# Patient Record
Sex: MALE | Race: WHITE | NOT HISPANIC OR LATINO | Employment: FULL TIME | URBAN - METROPOLITAN AREA
[De-identification: names, ages, dates, MRNs, and addresses within clinical notes are randomized per-mention and may not be internally consistent; named-entity substitution may affect disease eponyms.]

---

## 2018-03-08 DIAGNOSIS — F41.9 ANXIETY: Primary | ICD-10-CM

## 2018-03-09 RX ORDER — ALPRAZOLAM 0.25 MG/1
TABLET ORAL
Qty: 60 TABLET | Refills: 1 | Status: SHIPPED | OUTPATIENT
Start: 2018-03-09 | End: 2018-06-24 | Stop reason: SDUPTHER

## 2018-05-04 DIAGNOSIS — L30.9 ECZEMA, UNSPECIFIED TYPE: ICD-10-CM

## 2018-05-04 RX ORDER — CLINDAMYCIN AND BENZOYL PEROXIDE 10; 50 MG/G; MG/G
GEL TOPICAL 2 TIMES DAILY
Qty: 50 G | Refills: 3 | Status: SHIPPED | OUTPATIENT
Start: 2018-05-04 | End: 2019-07-10 | Stop reason: SDUPTHER

## 2018-05-04 RX ORDER — DESONIDE 0.5 MG/ML
LOTION TOPICAL 2 TIMES DAILY
Qty: 59 ML | Refills: 2 | Status: SHIPPED | OUTPATIENT
Start: 2018-05-04 | End: 2018-10-04 | Stop reason: SDUPTHER

## 2018-06-24 DIAGNOSIS — F41.9 ANXIETY: ICD-10-CM

## 2018-06-25 RX ORDER — ALPRAZOLAM 0.25 MG/1
TABLET ORAL
Qty: 60 TABLET | Refills: 1 | Status: SHIPPED | OUTPATIENT
Start: 2018-06-25 | End: 2019-01-24 | Stop reason: SDUPTHER

## 2018-07-10 DIAGNOSIS — J01.00 ACUTE NON-RECURRENT MAXILLARY SINUSITIS: Primary | ICD-10-CM

## 2018-07-10 RX ORDER — AZITHROMYCIN 250 MG/1
TABLET, FILM COATED ORAL
Qty: 6 TABLET | Refills: 0 | Status: SHIPPED | OUTPATIENT
Start: 2018-07-10 | End: 2018-07-14

## 2018-10-04 DIAGNOSIS — L30.9 ECZEMA, UNSPECIFIED TYPE: ICD-10-CM

## 2018-10-05 RX ORDER — DESONIDE 0.5 MG/ML
LOTION TOPICAL
Qty: 59 ML | Refills: 1 | Status: SHIPPED | OUTPATIENT
Start: 2018-10-05 | End: 2019-02-27 | Stop reason: SDUPTHER

## 2018-10-07 DIAGNOSIS — K21.9 GASTROESOPHAGEAL REFLUX DISEASE WITHOUT ESOPHAGITIS: Primary | ICD-10-CM

## 2018-10-08 RX ORDER — ESOMEPRAZOLE MAGNESIUM 40 MG/1
CAPSULE, DELAYED RELEASE ORAL
Qty: 90 CAPSULE | Refills: 3 | Status: SHIPPED | OUTPATIENT
Start: 2018-10-08 | End: 2019-09-30 | Stop reason: ALTCHOICE

## 2018-10-11 ENCOUNTER — OFFICE VISIT (OUTPATIENT)
Dept: FAMILY MEDICINE CLINIC | Facility: CLINIC | Age: 43
End: 2018-10-11
Payer: COMMERCIAL

## 2018-10-11 VITALS
SYSTOLIC BLOOD PRESSURE: 110 MMHG | HEIGHT: 68 IN | DIASTOLIC BLOOD PRESSURE: 86 MMHG | BODY MASS INDEX: 29.34 KG/M2 | TEMPERATURE: 98 F | RESPIRATION RATE: 14 BRPM | OXYGEN SATURATION: 97 % | WEIGHT: 193.6 LBS | HEART RATE: 99 BPM

## 2018-10-11 DIAGNOSIS — L03.211 CELLULITIS, FACE: ICD-10-CM

## 2018-10-11 PROCEDURE — 99213 OFFICE O/P EST LOW 20 MIN: CPT | Performed by: FAMILY MEDICINE

## 2018-10-11 PROCEDURE — 1036F TOBACCO NON-USER: CPT | Performed by: FAMILY MEDICINE

## 2018-10-11 NOTE — PROGRESS NOTES
Assessment/Plan:   Cellulitis  Cleocin 150 mg t i d  for 7 days  Wash with soap and water  BenzaClin topical apply b i d  Call if no improvement  Call with any questions or concerns  Diagnoses and all orders for this visit:    Cellulitis, face          Subjective:     Patient ID: Landon Lou is a 37 y o  unknown  This is a 49-year-old gentleman who presents with a pustule on the right face  Review of Systems   Constitutional: Negative  Respiratory: Negative  Cardiovascular: Negative  Skin: Wound: Pustule with surrounding erythema on the right face  Objective:     Physical Exam   Constitutional: He appears well-developed and well-nourished  HENT:   Head: Normocephalic and atraumatic  Skin:   5 mm pustule with surrounding erythema on the right face

## 2019-01-03 DIAGNOSIS — L70.9 ACNE, UNSPECIFIED ACNE TYPE: Primary | ICD-10-CM

## 2019-01-03 RX ORDER — CLINDAMYCIN HYDROCHLORIDE 150 MG/1
CAPSULE ORAL
Qty: 21 CAPSULE | Refills: 0 | Status: SHIPPED | OUTPATIENT
Start: 2019-01-03 | End: 2019-01-10

## 2019-01-24 DIAGNOSIS — F41.9 ANXIETY: ICD-10-CM

## 2019-01-24 RX ORDER — ALPRAZOLAM 0.25 MG/1
TABLET ORAL
Qty: 60 TABLET | Refills: 1 | Status: SHIPPED | OUTPATIENT
Start: 2019-01-24 | End: 2019-05-01 | Stop reason: SDUPTHER

## 2019-02-27 DIAGNOSIS — L30.9 ECZEMA, UNSPECIFIED TYPE: ICD-10-CM

## 2019-02-28 RX ORDER — DESONIDE 0.5 MG/ML
LOTION TOPICAL
Qty: 59 ML | Refills: 1 | Status: SHIPPED | OUTPATIENT
Start: 2019-02-28 | End: 2019-06-19 | Stop reason: SDUPTHER

## 2019-05-01 DIAGNOSIS — F41.9 ANXIETY: ICD-10-CM

## 2019-05-02 RX ORDER — ALPRAZOLAM 0.25 MG/1
TABLET ORAL
Qty: 60 TABLET | Refills: 1 | Status: SHIPPED | OUTPATIENT
Start: 2019-05-02 | End: 2019-08-27 | Stop reason: SDUPTHER

## 2019-06-19 DIAGNOSIS — L30.9 ECZEMA, UNSPECIFIED TYPE: ICD-10-CM

## 2019-06-19 RX ORDER — DESONIDE 0.5 MG/ML
LOTION TOPICAL
Qty: 59 ML | Refills: 1 | Status: SHIPPED | OUTPATIENT
Start: 2019-06-19 | End: 2019-07-11 | Stop reason: SDUPTHER

## 2019-07-10 DIAGNOSIS — L30.9 ECZEMA, UNSPECIFIED TYPE: ICD-10-CM

## 2019-07-11 DIAGNOSIS — L30.9 ECZEMA, UNSPECIFIED TYPE: ICD-10-CM

## 2019-07-11 RX ORDER — DESONIDE 0.5 MG/ML
LOTION TOPICAL 2 TIMES DAILY
Qty: 59 ML | Refills: 1 | Status: SHIPPED | OUTPATIENT
Start: 2019-07-11 | End: 2019-07-24 | Stop reason: SDUPTHER

## 2019-07-11 RX ORDER — CLINDAMYCIN AND BENZOYL PEROXIDE 10; 50 MG/G; MG/G
GEL TOPICAL
Qty: 50 G | Refills: 3 | Status: SHIPPED | OUTPATIENT
Start: 2019-07-11 | End: 2020-08-19

## 2019-07-23 ENCOUNTER — TELEPHONE (OUTPATIENT)
Dept: OTHER | Facility: OTHER | Age: 44
End: 2019-07-23

## 2019-07-24 DIAGNOSIS — L30.9 ECZEMA, UNSPECIFIED TYPE: ICD-10-CM

## 2019-07-24 RX ORDER — DESONIDE 0.5 MG/ML
LOTION TOPICAL 2 TIMES DAILY
Qty: 59 ML | Refills: 1 | Status: SHIPPED | OUTPATIENT
Start: 2019-07-24 | End: 2019-09-30 | Stop reason: ALTCHOICE

## 2019-07-24 NOTE — TELEPHONE ENCOUNTER
Called pt on cell [phone] he states [facility] is giving him a hard time to get a refill of his medication because they are saying he needs preauth from his insurance  [facility] tells him they have called our office many times without response  I told him we have no messages from [facility], but I will call them  Called [facility] [phone] - unable to LM as they don't open til 9AM    Sent a request to Dr Merritt Heimlich for a refill of the Desonide lotion for Ezcema as it looks like it was D/C'ed on the pt's chart

## 2019-07-24 NOTE — TELEPHONE ENCOUNTER
Called pt on cell [phone] he states [facility] is giving him a hard time to get a refill of his medication because they are saying he needs preauth from his insurance  [facility] tells him they have called our office many times without response  I told him we have no messages from [facility], but I will call them      Called [facility] [phone] - unable to LM as they don't open til [place] a request to Dr Sukhwinder Warren for a refill of the Desonide lotion for Ezcema as it looks like it was D/C'ed on the pt's chart

## 2019-07-24 NOTE — TELEPHONE ENCOUNTER
I called [facility] since Lorrie Crane was not able to speak to someone  I spoke to [address] at [facility]   They have been faxing to our old fax number that has been disconnected  I provided the correct number and [address] states she will fax form again  Once we receive, we will start the prior auth process

## 2019-07-24 NOTE — TELEPHONE ENCOUNTER
Received a call from Christina Jacobs  Relayed the following message to Christina Jacobs    Told him we will look out for it

## 2019-08-27 DIAGNOSIS — F41.9 ANXIETY: ICD-10-CM

## 2019-08-27 RX ORDER — ALPRAZOLAM 0.25 MG/1
TABLET ORAL
Qty: 60 TABLET | Refills: 1 | Status: SHIPPED | OUTPATIENT
Start: 2019-08-27 | End: 2019-12-18 | Stop reason: SDUPTHER

## 2019-09-30 ENCOUNTER — OFFICE VISIT (OUTPATIENT)
Dept: FAMILY MEDICINE CLINIC | Facility: CLINIC | Age: 44
End: 2019-09-30
Payer: COMMERCIAL

## 2019-09-30 VITALS
SYSTOLIC BLOOD PRESSURE: 128 MMHG | WEIGHT: 203 LBS | BODY MASS INDEX: 30.07 KG/M2 | OXYGEN SATURATION: 95 % | DIASTOLIC BLOOD PRESSURE: 74 MMHG | RESPIRATION RATE: 16 BRPM | TEMPERATURE: 98.4 F | HEART RATE: 88 BPM | HEIGHT: 69 IN

## 2019-09-30 DIAGNOSIS — Z23 NEED FOR INFLUENZA VACCINATION: ICD-10-CM

## 2019-09-30 DIAGNOSIS — Z13.1 SCREENING FOR DIABETES MELLITUS: ICD-10-CM

## 2019-09-30 DIAGNOSIS — Z00.00 ENCOUNTER FOR ANNUAL GENERAL MEDICAL EXAMINATION WITHOUT ABNORMAL FINDINGS IN ADULT: Primary | ICD-10-CM

## 2019-09-30 DIAGNOSIS — Z23 NEED FOR TDAP VACCINATION: ICD-10-CM

## 2019-09-30 DIAGNOSIS — E34.9 TESTOSTERONE DEFICIENCY: ICD-10-CM

## 2019-09-30 DIAGNOSIS — Z13.220 SCREENING FOR LIPID DISORDERS: ICD-10-CM

## 2019-09-30 DIAGNOSIS — Z13.29 SCREENING FOR THYROID DISORDER: ICD-10-CM

## 2019-09-30 DIAGNOSIS — Z13.0 SCREENING FOR DEFICIENCY ANEMIA: ICD-10-CM

## 2019-09-30 PROBLEM — G47.9 SLEEP DISTURBANCE: Status: ACTIVE | Noted: 2017-04-19

## 2019-09-30 LAB
SL AMB  POCT GLUCOSE, UA: 0
SL AMB LEUKOCYTE ESTERASE,UA: 0
SL AMB POCT BILIRUBIN,UA: 0
SL AMB POCT BLOOD,UA: 0
SL AMB POCT CLARITY,UA: CLEAR
SL AMB POCT COLOR,UA: YELLOW
SL AMB POCT KETONES,UA: 0
SL AMB POCT NITRITE,UA: 0
SL AMB POCT PH,UA: 7
SL AMB POCT SPECIFIC GRAVITY,UA: 1
SL AMB POCT URINE PROTEIN: 0
SL AMB POCT UROBILINOGEN: 0

## 2019-09-30 PROCEDURE — 90686 IIV4 VACC NO PRSV 0.5 ML IM: CPT

## 2019-09-30 PROCEDURE — 36415 COLL VENOUS BLD VENIPUNCTURE: CPT | Performed by: NURSE PRACTITIONER

## 2019-09-30 PROCEDURE — 90471 IMMUNIZATION ADMIN: CPT

## 2019-09-30 PROCEDURE — 90472 IMMUNIZATION ADMIN EACH ADD: CPT

## 2019-09-30 PROCEDURE — 99396 PREV VISIT EST AGE 40-64: CPT | Performed by: NURSE PRACTITIONER

## 2019-09-30 PROCEDURE — 81003 URINALYSIS AUTO W/O SCOPE: CPT | Performed by: NURSE PRACTITIONER

## 2019-09-30 PROCEDURE — 90715 TDAP VACCINE 7 YRS/> IM: CPT

## 2019-09-30 RX ORDER — ANASTROZOLE 1 MG/1
0.5 TABLET ORAL WEEKLY
COMMUNITY

## 2019-09-30 RX ORDER — IMIPRAMINE HYDROCHLORIDE 10 MG/1
1 TABLET, FILM COATED ORAL
COMMUNITY
Start: 2017-04-19 | End: 2019-09-30 | Stop reason: ALTCHOICE

## 2019-09-30 RX ORDER — TESTOSTERONE CYPIONATE 200 MG/ML
50 INJECTION INTRAMUSCULAR ONCE
COMMUNITY
End: 2021-10-04 | Stop reason: SDUPTHER

## 2019-09-30 RX ORDER — NEEDLES, DISPOSABLE 25GX5/8"
NEEDLE, DISPOSABLE MISCELLANEOUS
Refills: 1 | COMMUNITY
Start: 2019-07-09

## 2019-09-30 RX ORDER — FINASTERIDE 1 MG/1
1 TABLET, FILM COATED ORAL DAILY
COMMUNITY

## 2019-09-30 RX ORDER — HYDROCODONE BITARTRATE AND ACETAMINOPHEN 5; 300 MG/1; MG/1
TABLET ORAL
Refills: 0 | COMMUNITY
Start: 2019-07-30 | End: 2019-09-30 | Stop reason: ALTCHOICE

## 2019-09-30 RX ORDER — ESOMEPRAZOLE MAGNESIUM 40 MG/1
1 CAPSULE, DELAYED RELEASE ORAL DAILY
COMMUNITY
Start: 2016-11-09 | End: 2019-10-03

## 2019-09-30 RX ORDER — LEVOTHYROXINE AND LIOTHYRONINE 19; 4.5 UG/1; UG/1
1 TABLET ORAL DAILY
COMMUNITY

## 2019-09-30 RX ORDER — PREDNISONE 10 MG/1
1 TABLET ORAL DAILY
COMMUNITY
Start: 2016-11-09 | End: 2019-09-30 | Stop reason: ALTCHOICE

## 2019-09-30 NOTE — PROGRESS NOTES
[facility] HEALTH MAINTENANCE OFFICE VISIT  [facility]    NAME: Jasson Martínez  AGE: 40 y o  SEX: male  : 1975     DATE: 2019    Assessment and Plan     1  Encounter for annual general medical examination without abnormal findings in adult  Comments:  Age appropriate screenings and recommendations discussed  Orders:  -     CBC and differential  -     Comprehensive metabolic panel  -     Lipid panel  -     TSH, 3rd generation    2  Screening for deficiency anemia  -     CBC and differential    3  Screening for diabetes mellitus  -     Comprehensive metabolic panel    4  Screening for lipid disorders  -     Lipid panel    5  Screening for thyroid disorder  -     TSH, 3rd generation    6  Need for influenza vaccination  -     influenza vaccine, [id number], quadrivalent, 0 5 mL, preservative-free, for adult and pediatric patients 6 mos+ (AFLURIA, FLUARIX, FLULAVAL, FLUZONE)    7  Need for Tdap vaccination  -     TDAP VACCINE GREATER THAN OR EQUAL TO 8YO IM    8  Testosterone deficiency  Assessment & Plan:  Dr Myrtle Kaufman, following up for testosterone injections      · Patient Counseling:   · Nutrition: Stressed importance of a well balanced diet, moderation of sodium/saturated fat, caloric balance and sufficient intake of fiber  · Exercise: Stressed the importance of regular exercise with a goal of 150 minutes per week  · Dental Health: Discussed daily flossing and brushing and regular dental visits   · Sexuality: Discussed sexually transmitted infections, use of condoms and prevention of unintended pregnancy  · Alcohol Use:  Recommended moderation of alcohol intake  · Injury Prevention: Discussed Safety Belts, Safety Helmets, and Smoke Detectors    · Immunizations reviewed: Risks and Benefits discussed  · Discussed benefits of:  Screening labs   BMI Counseling: Body mass index is 30 42 kg/m²  Discussed with patient's BMI with him   The BMI is above normal  Nutrition recommendations include reducing portion sizes, 3-5 servings of fruits/vegetables daily, reducing fast food intake, consuming healthier snacks, decreasing soda and/or juice intake, moderation in carbohydrate intake, increasing intake of lean protein, reducing intake of saturated fat and trans fat and reducing intake of cholesterol  Exercise recommendations include moderate aerobic physical activity for 150 minutes/week and exercising 3-5 times per week  Return in about 1 year (around 9/30/2020) for As Needed, Annual physical     Chief Complaint     Chief Complaint   Patient presents with    Physical Exam       History of Present Illness     HPI    Well Adult Physical   Patient here for a comprehensive physical exam       Diet and Physical Activity  Diet: well balanced diet  Exercise: frequently      Depression Screen  PHQ-9 Depression Screening    PHQ-9:    Frequency of the following problems over the past two weeks:       Little interest or pleasure in doing things:  0 - not at all  Feeling down, depressed, or hopeless:  0 - not at all  PHQ-2 Score:  0          General Health  Hearing: Normal:  bilateral  Vision: vision problems: has not followed up with ophthamology  Dental: regular dental visits    Reproductive Health  No issues       The following portions of the patient's history were reviewed and updated as appropriate: allergies, current medications, past family history, past medical history, past social history, past surgical history and problem list     Review of Systems     Review of Systems   Constitutional: Negative for diaphoresis, fatigue and fever  HENT: Negative for ear pain and hearing loss  Eyes: Negative for pain and visual disturbance  Respiratory: Negative for chest tightness and shortness of breath  Cardiovascular: Negative for chest pain, palpitations and leg swelling  Gastrointestinal: Negative for abdominal pain, constipation and diarrhea  Genitourinary: Negative for difficulty urinating  Musculoskeletal: Negative for arthralgias and myalgias  Skin: Negative for rash  Neurological: Negative for dizziness, numbness and headaches  Psychiatric/Behavioral: Negative for sleep disturbance  Past Medical History     No past medical history on file      Past Surgical History     Past Surgical History:   Procedure Laterality Date    HERNIA REPAIR      Done at birth    [place] SHOULDER SURGERY Right 12/2010       Social History     Social History     Socioeconomic History    Marital status: Single     Spouse name: None    Number of children: None    Years of education: None    Highest education level: None   Occupational History    None   Social Needs    Financial resource strain: None    Food insecurity:     Worry: None     Inability: None    Transportation needs:     Medical: None     Non-medical: None   Tobacco Use    Smoking status: Never Smoker    Smokeless tobacco: Never Used   Substance and Sexual Activity    Alcohol use: Yes     Comment: occasionally    Drug use: No    Sexual activity: None   Lifestyle    Physical activity:     Days per week: None     Minutes per session: None    Stress: None   Relationships    Social connections:     Talks on phone: None     Gets together: None     Attends Temple service: None     Active member of club or organization: None     Attends meetings of clubs or organizations: None     Relationship status: None    Intimate partner violence:     Fear of current or ex partner: None     Emotionally abused: None     Physically abused: None     Forced sexual activity: None   Other Topics Concern    None   Social History Narrative    Caffeine use    Drinks coffee    Never a smoker - As per [employer]        Family History     Family History   Problem Relation Age of Onset    NASIR disease Mother     Thyroid disease Mother     Thyroid disease Father     Esophageal cancer Maternal Aunt     Depression Cousin        Current Medications       Current Outpatient Medications:     ALPRAZolam (XANAX) 0 25 mg tablet, TAKE 1 TABLET BY MOUTH TWICE A DAY AS NEEDED, Disp: 60 tablet, Rfl: 1    anastrozole (ARIMIDEX) 1 mg tablet, Take 1 mg by mouth 2 (two) times a week with meals, Disp: , Rfl:     B-D DISP NEEDLE 25GX1" 25G X 1" MISC, Use as directed, Disp: , Rfl: 1    clindamycin-benzoyl peroxide (BENZACLIN) gel, APPLY BY TOPICAL ROUTE TO THE AFFECTED AREA(S) TWICE A DAY, Disp: 50 g, Rfl: 3    esomeprazole (NexIUM) 40 MG capsule, Take 1 capsule by mouth daily, Disp: , Rfl:     finasteride (PROPECIA) 1 MG tablet, Take 1 tablet by mouth daily, Disp: , Rfl:     testosterone cypionate (DEPO-TESTOSTERONE) 200 mg/mL SOLN, Inject 50 mg into a muscle once, Disp: , Rfl:     thyroid (ARMOUR THYROID) 60 MG tablet, Take 1 tablet by mouth daily, Disp: , Rfl:      Allergies     Allergies   Allergen Reactions    Penicillins     Sulfa Antibiotics        Objective     /74   Pulse 88   Temp 98 4 °F (36 9 °C) (Temporal)   Resp 16   Ht 5' 8 5" (1 74 m)   Wt 92 1 kg (203 lb)   SpO2 95%   BMI 30 42 kg/m²      Physical Exam   Constitutional: He is oriented to person, place, and time  He appears well-developed and well-nourished  HENT:   Head: Normocephalic and atraumatic  Right Ear: Tympanic membrane and external ear normal    Left Ear: Tympanic membrane and external ear normal    Eyes: Pupils are equal, round, and reactive to light  Conjunctivae, EOM and lids are normal  Right conjunctiva is not injected  Left conjunctiva is not injected  Right eye exhibits normal extraocular motion and no nystagmus  Left eye exhibits normal extraocular motion and no nystagmus  Right pupil is round and reactive  Left pupil is round and reactive  Pupils are equal    Neck: Normal range of motion  Neck supple  Carotid bruit is not present  No tracheal deviation present  No thyromegaly present     Cardiovascular: Normal rate, regular rhythm, normal heart sounds and intact distal pulses  Exam reveals no gallop and no friction rub  No murmur heard  Pulmonary/Chest: Effort normal and breath sounds normal  No respiratory distress  He has no wheezes  He has no rales  Abdominal: Soft  Bowel sounds are normal  He exhibits no distension  There is no splenomegaly or hepatomegaly  There is no tenderness  There is no rebound  Musculoskeletal: Normal range of motion  He exhibits no edema, tenderness or deformity  Lymphadenopathy:     He has no cervical adenopathy  Neurological: He is alert and oriented to person, place, and time  He has normal strength and normal reflexes  No cranial nerve deficit  Coordination normal    Skin: Skin is warm and dry  No rash noted  No erythema  Psychiatric: He has a normal mood and affect   His behavior is normal  Judgment and thought content normal          Michelle Weaver, [address] [facility]

## 2019-09-30 NOTE — PATIENT INSTRUCTIONS

## 2019-10-01 LAB
ALBUMIN SERPL-MCNC: 4.7 G/DL (ref 3.5–5.5)
ALBUMIN/GLOB SERPL: 1.6 {RATIO} (ref 1.2–2.2)
ALP SERPL-CCNC: 69 IU/L (ref 39–117)
ALT SERPL-CCNC: 32 IU/L (ref 0–44)
AST SERPL-CCNC: 20 IU/L (ref 0–40)
BASOPHILS # BLD AUTO: 0 X10E3/UL (ref 0–0.2)
BASOPHILS NFR BLD AUTO: 1 %
BILIRUB SERPL-MCNC: 0.2 MG/DL (ref 0–1.2)
BUN SERPL-MCNC: 13 MG/DL (ref 6–24)
BUN/CREAT SERPL: 13 (ref 9–20)
CALCIUM SERPL-MCNC: 9.6 MG/DL (ref 8.7–10.2)
CHLORIDE SERPL-SCNC: 99 MMOL/L (ref 96–106)
CHOLEST SERPL-MCNC: 211 MG/DL (ref 100–199)
CHOLEST/HDLC SERPL: 4.1 RATIO (ref 0–5)
CO2 SERPL-SCNC: 28 MMOL/L (ref 20–29)
CREAT SERPL-MCNC: 1.03 MG/DL (ref 0.76–1.27)
EOSINOPHIL # BLD AUTO: 0.2 X10E3/UL (ref 0–0.4)
EOSINOPHIL NFR BLD AUTO: 4 %
ERYTHROCYTE [DISTWIDTH] IN BLOOD BY AUTOMATED COUNT: 14.7 % (ref 12.3–15.4)
GLOBULIN SER-MCNC: 3 G/DL (ref 1.5–4.5)
GLUCOSE SERPL-MCNC: 100 MG/DL (ref 65–99)
HCT VFR BLD AUTO: 46.9 % (ref 37.5–51)
HDLC SERPL-MCNC: 52 MG/DL
HGB BLD-MCNC: 16.2 G/DL (ref 13–17.7)
IMM GRANULOCYTES # BLD: 0 X10E3/UL (ref 0–0.1)
IMM GRANULOCYTES NFR BLD: 0 %
LDLC SERPL CALC-MCNC: 114 MG/DL (ref 0–99)
LYMPHOCYTES # BLD AUTO: 2.2 X10E3/UL (ref 0.7–3.1)
LYMPHOCYTES NFR BLD AUTO: 41 %
MCH RBC QN AUTO: 31.8 PG (ref 26.6–33)
MCHC RBC AUTO-ENTMCNC: 34.5 G/DL (ref 31.5–35.7)
MCV RBC AUTO: 92 FL (ref 79–97)
MONOCYTES # BLD AUTO: 0.4 X10E3/UL (ref 0.1–0.9)
MONOCYTES NFR BLD AUTO: 7 %
NEUTROPHILS # BLD AUTO: 2.6 X10E3/UL (ref 1.4–7)
NEUTROPHILS NFR BLD AUTO: 47 %
PLATELET # BLD AUTO: 232 X10E3/UL (ref 150–450)
POTASSIUM SERPL-SCNC: 4.1 MMOL/L (ref 3.5–5.2)
PROT SERPL-MCNC: 7.7 G/DL (ref 6–8.5)
RBC # BLD AUTO: 5.1 X10E6/UL (ref 4.14–5.8)
SL AMB EGFR AFRICAN AMERICAN: 102 ML/MIN/1.73
SL AMB EGFR NON AFRICAN AMERICAN: 88 ML/MIN/1.73
SL AMB VLDL CHOLESTEROL CALC: 45 MG/DL (ref 5–40)
SODIUM SERPL-SCNC: 139 MMOL/L (ref 134–144)
TRIGL SERPL-MCNC: 226 MG/DL (ref 0–149)
TSH SERPL DL<=0.005 MIU/L-ACNC: 2.6 UIU/ML (ref 0.45–4.5)
WBC # BLD AUTO: 5.5 X10E3/UL (ref 3.4–10.8)

## 2019-10-03 DIAGNOSIS — K21.9 GASTROESOPHAGEAL REFLUX DISEASE WITHOUT ESOPHAGITIS: ICD-10-CM

## 2019-10-03 RX ORDER — ESOMEPRAZOLE MAGNESIUM 40 MG/1
CAPSULE, DELAYED RELEASE ORAL
Qty: 30 CAPSULE | Refills: 11 | Status: SHIPPED | OUTPATIENT
Start: 2019-10-03 | End: 2020-08-23 | Stop reason: SDUPTHER

## 2019-10-04 ENCOUNTER — TELEPHONE (OUTPATIENT)
Dept: FAMILY MEDICINE CLINIC | Facility: CLINIC | Age: 44
End: 2019-10-04

## 2019-10-04 NOTE — TELEPHONE ENCOUNTER
Was in on Monday to see you  Had a flu shot and tetanus shot in the left arm  Yesterday he started experiencing pain just in the left arm    It hurts, feels like he dislocated his shoulder

## 2019-10-04 NOTE — TELEPHONE ENCOUNTER
Arm pain is common with tetanus and it can be pretty painful  He can take ibuprofen and rest the arm  If he experiences any swelling or persistence of pain >4 days, he can come back to have his arm examined

## 2019-10-08 ENCOUNTER — OFFICE VISIT (OUTPATIENT)
Dept: FAMILY MEDICINE CLINIC | Facility: CLINIC | Age: 44
End: 2019-10-08
Payer: COMMERCIAL

## 2019-10-08 DIAGNOSIS — L30.9 ECZEMA, UNSPECIFIED TYPE: ICD-10-CM

## 2019-10-08 DIAGNOSIS — L73.9 FOLLICULITIS: ICD-10-CM

## 2019-10-08 DIAGNOSIS — E78.1 HYPERTRIGLYCERIDEMIA: Primary | ICD-10-CM

## 2019-10-08 DIAGNOSIS — R73.01 ELEVATED FASTING BLOOD SUGAR: ICD-10-CM

## 2019-10-08 LAB — SL AMB POCT HEMOGLOBIN AIC: 5.2 (ref ?–6.5)

## 2019-10-08 PROCEDURE — 99213 OFFICE O/P EST LOW 20 MIN: CPT | Performed by: NURSE PRACTITIONER

## 2019-10-08 PROCEDURE — 83036 HEMOGLOBIN GLYCOSYLATED A1C: CPT | Performed by: NURSE PRACTITIONER

## 2019-10-08 RX ORDER — CHLORHEXIDINE GLUCONATE 0.12 MG/ML
RINSE ORAL
Refills: 1 | COMMUNITY
Start: 2019-09-10 | End: 2020-01-08 | Stop reason: ALTCHOICE

## 2019-10-08 RX ORDER — OXYCODONE HYDROCHLORIDE AND ACETAMINOPHEN 5; 325 MG/1; MG/1
1 TABLET ORAL EVERY 4 HOURS PRN
Refills: 0 | COMMUNITY
Start: 2019-09-10 | End: 2020-01-08 | Stop reason: ALTCHOICE

## 2019-10-08 RX ORDER — ASCORBIC ACID 500 MG
500 TABLET ORAL DAILY
COMMUNITY

## 2019-10-08 RX ORDER — ZINC GLUCONATE 50 MG
2 TABLET ORAL DAILY
COMMUNITY

## 2019-10-08 RX ORDER — MAG HYDROX/ALUMINUM HYD/SIMETH 400-400-40
SUSPENSION, ORAL (FINAL DOSE FORM) ORAL DAILY
COMMUNITY

## 2019-10-08 RX ORDER — DESONIDE 0.5 MG/ML
LOTION TOPICAL 2 TIMES DAILY
Qty: 59 ML | Refills: 0 | Status: SHIPPED | OUTPATIENT
Start: 2019-10-08 | End: 2020-02-27 | Stop reason: SDUPTHER

## 2019-10-08 RX ORDER — MULTIVITAMIN
1 TABLET ORAL DAILY
COMMUNITY

## 2019-10-08 RX ORDER — CLINDAMYCIN HYDROCHLORIDE 300 MG/1
CAPSULE ORAL
Refills: 0 | COMMUNITY
Start: 2019-09-10 | End: 2020-01-08 | Stop reason: ALTCHOICE

## 2019-10-08 NOTE — PROGRESS NOTES
Assessment/Plan:    1  Hypertriglyceridemia  Assessment & Plan:  Discussed nutrition via mediterranean style eating and exercise 60 minutes daily  Handout provided, reviewed with patient today  Recommend 3 month trial of nutrition and exercise to reduce triglycerides  If no improvement, consider treatment with statin  Pt verbalized understanding and is agreement with plan  Reports motivation for change  Orders:  -     Lipid panel; Future; Expected date: 01/08/2020    2  Elevated fasting blood sugar  Assessment & Plan:  A1c 5 2 wnl  Advised nutrition and exercise  Orders:  -     POCT hemoglobin A1c    3  Folliculitis  -     desonide (DESOWEN) 0 05 % lotion; Apply topically 2 (two) times a day  -     mupirocin (BACTROBAN) 2 % ointment; Apply topically 3 (three) times a day    4  Eczema, unspecified type  -     desonide (DESOWEN) 0 05 % lotion; Apply topically 2 (two) times a day    Patient Instructions   Heart Healthy Diet   WHAT YOU NEED TO KNOW:   A heart healthy diet is an eating plan low in total fat, unhealthy fats, and sodium (salt)  A heart healthy diet helps decrease your risk for heart disease and stroke  Limit the amount of fat you eat to 25% to 35% of your total daily calories  Limit sodium to less than 2,300 mg each day  DISCHARGE INSTRUCTIONS:   Healthy fats:  Healthy fats can help improve cholesterol levels  The risk for heart disease is decreased when cholesterol levels are normal  Choose healthy fats, such as the following:  · Unsaturated fat  is found in foods such as soybean, canola, olive, corn, and safflower oils  It is also found in soft tub margarine that is made with liquid vegetable oil  · Omega-3 fat  is found in certain fish, such as salmon, tuna, and trout, and in walnuts and flaxseed  Unhealthy fats:  Unhealthy fats can cause unhealthy cholesterol levels in your blood and increase your risk of heart disease   Limit unhealthy fats, such as the following:  · Cholesterol  is found in animal foods, such as eggs and lobster, and in dairy products made from whole milk  Limit cholesterol to less than 300 milligrams (mg) each day  You may need to limit cholesterol to 200 mg each day if you have heart disease  · Saturated fat  is found in meats, such as daniel and hamburger  It is also found in chicken or turkey skin, whole milk, and butter  Limit saturated fat to less than 7% of your total daily calories  Limit saturated fat to less than 6% if you have heart disease or are at increased risk for it  · Trans fat  is found in packaged foods, such as potato chips and cookies  It is also in hard margarine, some fried foods, and shortening  Avoid trans fats as much as possible    Heart healthy foods and drinks to include:  Ask your dietitian or healthcare provider how many servings to have from each of the following food groups:  · Grains:      ¨ Whole-wheat breads, cereals, and pastas, and brown rice    ¨ Low-fat, low-sodium crackers and chips    · Vegetables:      ¨ Broccoli, green beans, green peas, and spinach    ¨ Collards, kale, and lima beans    ¨ Carrots, sweet potatoes, tomatoes, and peppers    ¨ Canned vegetables with no salt added    · Fruits:      ¨ Bananas, peaches, pears, and pineapple    ¨ Grapes, raisins, and dates    ¨ Oranges, tangerines, grapefruit, orange juice, and grapefruit juice    ¨ Apricots, mangoes, melons, and papaya    ¨ Raspberries and strawberries    ¨ Canned fruit with no added sugar    · Low-fat dairy products:      ¨ Nonfat (skim) milk, 1% milk, and low-fat almond, cashew, or soy milks fortified with calcium    ¨ Low-fat cheese, regular or frozen yogurt, and cottage cheese    · Meats and proteins , such as lean cuts of beef and pork (loin, leg, round), skinless chicken and turkey, legumes, soy products, egg whites, and nuts  Foods and drinks to limit or avoid:  Ask your dietitian or healthcare provider about these and other foods that are high in unhealthy fat, sodium, and sugar:  · Snack or packaged foods , such as frozen dinners, cookies, macaroni and cheese, and cereals with more than 300 mg of sodium per serving    · Canned or dry mixes  for cakes, soups, sauces, or gravies    · Vegetables with added sodium , such as instant potatoes, vegetables with added sauces, or regular canned vegetables    · Other foods high in sodium , such as ketchup, barbecue sauce, salad dressing, pickles, olives, soy sauce, and miso    · High-fat dairy foods  such as whole or 2% milk, cream cheese, or sour cream, and cheeses     · High-fat protein foods  such as high-fat cuts of beef (T-bone steaks, ribs), chicken or turkey with skin, and organ meats, such as liver    · Cured or smoked meats , such as hot dogs, daniel, and sausage    · Unhealthy fats and oils , such as butter, stick margarine, shortening, and cooking oils such as coconut or palm oil    · Food and drinks high in sugar , such as soft drinks (soda), sports drinks, sweetened tea, candy, cake, cookies, pies, and doughnuts  Other diet guidelines to follow:   · Eat more foods containing omega-3 fats  Eat fish high in omega-3 fats at least 2 times a week  · Limit alcohol  Too much alcohol can damage your heart and raise your blood pressure  Women should limit alcohol to 1 drink a day  Men should limit alcohol to 2 drinks a day  A drink of alcohol is 12 ounces of beer, 5 ounces of wine, or 1½ ounces of liquor  · Choose low-sodium foods  High-sodium foods can lead to high blood pressure  Add little or no salt to food you prepare  Use herbs and spices in place of salt  · Eat more fiber  to help lower cholesterol levels  Eat at least 5 servings of fruits and vegetables each day  Eat 3 ounces of whole-grain foods each day  Legumes (beans) are also a good source of fiber  Lifestyle guidelines:   · Do not smoke  Nicotine and other chemicals in cigarettes and cigars can cause lung and heart damage   Ask your healthcare provider for information if you currently smoke and need help to quit  E-cigarettes or smokeless tobacco still contain nicotine  Talk to your healthcare provider before you use these products  · Exercise regularly  to help you maintain a healthy weight and improve your blood pressure and cholesterol levels  Ask your healthcare provider about the best exercise plan for you  Do not start an exercise program without asking your healthcare provider  Follow up with your healthcare provider as directed:  Write down your questions so you remember to ask them during your visits  © 2017 [facility] Information is for End User's use only and may not be sold, redistributed or otherwise used for commercial purposes  All illustrations and images included in CareNotes® are the copyrighted property of A D A M [employer] Inc  or Nabil Kelley  The above information is an  only  It is not intended as medical advice for individual conditions or treatments  Talk to your doctor, nurse or pharmacist before following any medical regimen to see if it is safe and effective for you  Return in about 3 months (around 1/8/2020) for Recheck Cholesterol  Subjective:      Patient ID: Jimena Marie is a 40 y o  male  Chief Complaint   Patient presents with    Follow-up     BW    Rash       Orquidea Ramos is a 40year old male who presents to the office for discussion of recent fasting labs  Additionally pt reports itchy rash on his chin and neck with red bumps  States he had been using desonide lotion in the past which helped to improve the rash  Denies any drainage or white heads  Reports he shaves his face everyday  Follows up with dermatology who diagnosed with eczema         The following portions of the patient's history were reviewed and updated as appropriate: allergies, current medications, past family history, past medical history, past social history, past surgical history and problem list     Review of Systems   Respiratory: Negative for shortness of breath  Cardiovascular: Negative for chest pain  Skin: Positive for rash  Hematological: Positive for adenopathy           Current Outpatient Medications   Medication Sig Dispense Refill    ALPRAZolam (XANAX) 0 25 mg tablet TAKE 1 TABLET BY MOUTH TWICE A DAY AS NEEDED 60 tablet 1    anastrozole (ARIMIDEX) 1 mg tablet Take 1 mg by mouth 2 (two) times a week with meals      ascorbic acid (VITAMIN C) 500 mg tablet Take 500 mg by mouth daily      B-D DISP NEEDLE 25GX1" 25G X 1" MISC Use as directed  1    Calcium Carbonate-Vitamin D (CALCIUM 600+D PO) Take by mouth daily      Cholecalciferol (VITAMIN D3) 5000 units CAPS Take by mouth daily      clindamycin-benzoyl peroxide (BENZACLIN) gel APPLY BY TOPICAL ROUTE TO THE AFFECTED AREA(S) TWICE A DAY 50 g 3    ECHINACEA PO Take by mouth daily      esomeprazole (NexIUM) 40 MG capsule TAKE 1 CAPSULE DAILY 30 capsule 11    finasteride (PROPECIA) 1 MG tablet Take 1 tablet by mouth daily      hydrocortisone 2 5 % cream Apply 1 application topically 2 (two) times a day as needed To affected area  0    Multiple Vitamin (MULTIVITAMIN) tablet Take 1 tablet by mouth daily      testosterone cypionate (DEPO-TESTOSTERONE) 200 mg/mL SOLN Inject 50 mg into a muscle once      thyroid desiccated (ARMOUR THYROID) 30 mg tablet Take 1 tablet by mouth daily       Zinc 50 MG TABS Take 2 tablets by mouth daily      chlorhexidine (PERIDEX) 0 12 % solution RINSE WITH 1/2 OUNCE FOR 1 MINUTE AND EXPECTORATED TWICE A DAY  1    clindamycin (CLEOCIN) 300 MG capsule TAKE 2 CAPSULES 1 HOUR PRIOR TO SURGERY, THEN TAKE 1 CAPSULE EVERY 12 HOURS  0    desonide (DESOWEN) 0 05 % lotion Apply topically 2 (two) times a day 59 mL 0    mupirocin (BACTROBAN) 2 % ointment Apply topically 3 (three) times a day 22 g 0    oxyCODONE-acetaminophen (PERCOCET) 5-325 mg per tablet Take 1 tablet by mouth every 4 (four) hours as needed 0     No current facility-administered medications for this visit  Objective:     Physical Exam   Constitutional: He is oriented to person, place, and time  He appears well-developed and well-nourished  No distress  Neurological: He is alert and oriented to person, place, and time  Skin: Skin is warm and dry  Rash noted  Rash is papular  He is not diaphoretic  Erythematous papular rash noted on pt's neck, folliculitis   Psychiatric: He has a normal mood and affect   His behavior is normal  Judgment and thought content normal          BILL Crockett

## 2019-10-08 NOTE — ASSESSMENT & PLAN NOTE
Discussed nutrition via mediterranean style eating and exercise 60 minutes daily  Handout provided, reviewed with patient today  Recommend 3 month trial of nutrition and exercise to reduce triglycerides  If no improvement, consider treatment with statin  Pt verbalized understanding and is agreement with plan  Reports motivation for change

## 2019-10-08 NOTE — PATIENT INSTRUCTIONS
Heart Healthy Diet   WHAT YOU NEED TO KNOW:   A heart healthy diet is an eating plan low in total fat, unhealthy fats, and sodium (salt)  A heart healthy diet helps decrease your risk for heart disease and stroke  Limit the amount of fat you eat to 25% to 35% of your total daily calories  Limit sodium to less than 2,300 mg each day  DISCHARGE INSTRUCTIONS:   Healthy fats:  Healthy fats can help improve cholesterol levels  The risk for heart disease is decreased when cholesterol levels are normal  Choose healthy fats, such as the following:  · Unsaturated fat  is found in foods such as soybean, canola, olive, corn, and safflower oils  It is also found in soft tub margarine that is made with liquid vegetable oil  · Omega-3 fat  is found in certain fish, such as salmon, tuna, and trout, and in walnuts and flaxseed  Unhealthy fats:  Unhealthy fats can cause unhealthy cholesterol levels in your blood and increase your risk of heart disease  Limit unhealthy fats, such as the following:  · Cholesterol  is found in animal foods, such as eggs and lobster, and in dairy products made from whole milk  Limit cholesterol to less than 300 milligrams (mg) each day  You may need to limit cholesterol to 200 mg each day if you have heart disease  · Saturated fat  is found in meats, such as daniel and hamburger  It is also found in chicken or turkey skin, whole milk, and butter  Limit saturated fat to less than 7% of your total daily calories  Limit saturated fat to less than 6% if you have heart disease or are at increased risk for it  · Trans fat  is found in packaged foods, such as potato chips and cookies  It is also in hard margarine, some fried foods, and shortening  Avoid trans fats as much as possible    Heart healthy foods and drinks to include:  Ask your dietitian or healthcare provider how many servings to have from each of the following food groups:  · Grains:      ¨ Whole-wheat breads, cereals, and pastas, and brown rice    ¨ Low-fat, low-sodium crackers and chips    · Vegetables:      ¨ Broccoli, green beans, green peas, and spinach    ¨ Collards, kale, and lima beans    ¨ Carrots, sweet potatoes, tomatoes, and peppers    ¨ Canned vegetables with no salt added    · Fruits:      ¨ Bananas, peaches, pears, and pineapple    ¨ Grapes, raisins, and dates    ¨ Oranges, tangerines, grapefruit, orange juice, and grapefruit juice    ¨ Apricots, mangoes, melons, and papaya    ¨ Raspberries and strawberries    ¨ Canned fruit with no added sugar    · Low-fat dairy products:      ¨ Nonfat (skim) milk, 1% milk, and low-fat almond, cashew, or soy milks fortified with calcium    ¨ Low-fat cheese, regular or frozen yogurt, and cottage cheese    · Meats and proteins , such as lean cuts of beef and pork (loin, leg, round), skinless chicken and turkey, legumes, soy products, egg whites, and nuts  Foods and drinks to limit or avoid:  Ask your dietitian or healthcare provider about these and other foods that are high in unhealthy fat, sodium, and sugar:  · Snack or packaged foods , such as frozen dinners, cookies, macaroni and cheese, and cereals with more than 300 mg of sodium per serving    · Canned or dry mixes  for cakes, soups, sauces, or gravies    · Vegetables with added sodium , such as instant potatoes, vegetables with added sauces, or regular canned vegetables    · Other foods high in sodium , such as ketchup, barbecue sauce, salad dressing, pickles, olives, soy sauce, and miso    · High-fat dairy foods  such as whole or 2% milk, cream cheese, or sour cream, and cheeses     · High-fat protein foods  such as high-fat cuts of beef (T-bone steaks, ribs), chicken or turkey with skin, and organ meats, such as liver    · Cured or smoked meats , such as hot dogs, daniel, and sausage    · Unhealthy fats and oils , such as butter, stick margarine, shortening, and cooking oils such as coconut or palm oil    · Food and drinks high in sugar , such as soft drinks (soda), sports drinks, sweetened tea, candy, cake, cookies, pies, and doughnuts  Other diet guidelines to follow:   · Eat more foods containing omega-3 fats  Eat fish high in omega-3 fats at least 2 times a week  · Limit alcohol  Too much alcohol can damage your heart and raise your blood pressure  Women should limit alcohol to 1 drink a day  Men should limit alcohol to 2 drinks a day  A drink of alcohol is 12 ounces of beer, 5 ounces of wine, or 1½ ounces of liquor  · Choose low-sodium foods  High-sodium foods can lead to high blood pressure  Add little or no salt to food you prepare  Use herbs and spices in place of salt  · Eat more fiber  to help lower cholesterol levels  Eat at least 5 servings of fruits and vegetables each day  Eat 3 ounces of whole-grain foods each day  Legumes (beans) are also a good source of fiber  Lifestyle guidelines:   · Do not smoke  Nicotine and other chemicals in cigarettes and cigars can cause lung and heart damage  Ask your healthcare provider for information if you currently smoke and need help to quit  E-cigarettes or smokeless tobacco still contain nicotine  Talk to your healthcare provider before you use these products  · Exercise regularly  to help you maintain a healthy weight and improve your blood pressure and cholesterol levels  Ask your healthcare provider about the best exercise plan for you  Do not start an exercise program without asking your healthcare provider  Follow up with your healthcare provider as directed:  Write down your questions so you remember to ask them during your visits  © 2017 [address] [employer] Information is for End User's use only and may not be sold, redistributed or otherwise used for commercial purposes  All illustrations and images included in CareNotes® are the copyrighted property of A D [employer]  or Nabil Kelley  The above information is an  only   It is not intended as medical advice for individual conditions or treatments  Talk to your doctor, nurse or pharmacist before following any medical regimen to see if it is safe and effective for you

## 2019-12-18 DIAGNOSIS — F41.9 ANXIETY: ICD-10-CM

## 2019-12-19 RX ORDER — ALPRAZOLAM 0.25 MG/1
TABLET ORAL
Qty: 60 TABLET | Refills: 1 | Status: SHIPPED | OUTPATIENT
Start: 2019-12-19 | End: 2020-04-03

## 2020-01-08 ENCOUNTER — OFFICE VISIT (OUTPATIENT)
Dept: FAMILY MEDICINE CLINIC | Facility: CLINIC | Age: 45
End: 2020-01-08
Payer: COMMERCIAL

## 2020-01-08 VITALS
SYSTOLIC BLOOD PRESSURE: 124 MMHG | OXYGEN SATURATION: 96 % | WEIGHT: 201 LBS | BODY MASS INDEX: 29.77 KG/M2 | DIASTOLIC BLOOD PRESSURE: 70 MMHG | HEIGHT: 69 IN | TEMPERATURE: 97.7 F | RESPIRATION RATE: 16 BRPM | HEART RATE: 86 BPM

## 2020-01-08 DIAGNOSIS — E78.1 HYPERTRIGLYCERIDEMIA: Primary | ICD-10-CM

## 2020-01-08 DIAGNOSIS — M77.8 RIGHT WRIST TENDONITIS: ICD-10-CM

## 2020-01-08 PROCEDURE — 36415 COLL VENOUS BLD VENIPUNCTURE: CPT | Performed by: NURSE PRACTITIONER

## 2020-01-08 PROCEDURE — 99213 OFFICE O/P EST LOW 20 MIN: CPT | Performed by: NURSE PRACTITIONER

## 2020-01-08 RX ORDER — PHENTERMINE HYDROCHLORIDE 37.5 MG/1
37.5 TABLET ORAL DAILY PRN
Refills: 0 | COMMUNITY
Start: 2019-12-01 | End: 2020-01-16 | Stop reason: ALTCHOICE

## 2020-01-08 NOTE — ASSESSMENT & PLAN NOTE
Discussed nutrition and exercise  Recommend he continue physical activity daily  Will evaluate fasting lipid panel today  Call with results, determine need for intervention  Recheck 6 months

## 2020-01-08 NOTE — ASSESSMENT & PLAN NOTE
Symptoms improving per pt  Advise rest, ice and ibuprofen as directed and with food  RTO if symptoms persist or worsen

## 2020-01-08 NOTE — PROGRESS NOTES
Assessment/Plan:    1  Hypertriglyceridemia  Assessment & Plan:  Discussed nutrition and exercise  Recommend he continue physical activity daily  Will evaluate fasting lipid panel today  Call with results, determine need for intervention  Recheck 6 months  Orders:  -     Lipid panel    2  Right wrist tendonitis  Assessment & Plan:  Symptoms improving per pt  Advise rest, ice and ibuprofen as directed and with food  RTO if symptoms persist or worsen  Return in about 6 months (around 7/8/2020) for Recheck Lipid Panel   Subjective:      Patient ID: Alexis Ortiz is a 40 y o  male  Chief Complaint   Patient presents with    Follow-up     cholesterol       Garret Osuna is a 40year old male with hypertriglyceridemia who presents to the office for a recheck of triglycerides  He is not on any medication at this time for treatment  He reports he has been having some difficulty with nutrition r/t the recent holidays  States he continues to work out daily  Additionally, reporting pain in right wrist with movement x's 2-3 months  Denies numbness or tingling of the fingers  The following portions of the patient's history were reviewed and updated as appropriate: allergies, current medications, past family history, past medical history, past social history, past surgical history and problem list     Review of Systems   Respiratory: Negative for chest tightness and shortness of breath  Cardiovascular: Negative for chest pain and leg swelling  Musculoskeletal: Positive for arthralgias (right wrist)  Neurological: Negative for dizziness           Current Outpatient Medications   Medication Sig Dispense Refill    ALPRAZolam (XANAX) 0 25 mg tablet TAKE 1 TABLET BY MOUTH TWICE A DAY AS NEEDED 60 tablet 1    anastrozole (ARIMIDEX) 1 mg tablet Take 1 mg by mouth 2 (two) times a week with meals      B-D DISP NEEDLE 25GX1" 25G X 1" MISC Use as directed  1    Calcium Carbonate-Vitamin D (CALCIUM 600+D PO) Take by mouth daily      Cholecalciferol (VITAMIN D3) 5000 units CAPS Take by mouth daily      clindamycin-benzoyl peroxide (BENZACLIN) gel APPLY BY TOPICAL ROUTE TO THE AFFECTED AREA(S) TWICE A DAY 50 g 3    desonide (DESOWEN) 0 05 % lotion Apply topically 2 (two) times a day 59 mL 0    ECHINACEA PO Take by mouth daily      esomeprazole (NexIUM) 40 MG capsule TAKE 1 CAPSULE DAILY 30 capsule 11    finasteride (PROPECIA) 1 MG tablet Take 1 tablet by mouth daily      Multiple Vitamin (MULTIVITAMIN) tablet Take 1 tablet by mouth daily      phentermine (ADIPEX-P) 37 5 MG tablet Take 37 5 mg by mouth daily as needed  0    testosterone cypionate (DEPO-TESTOSTERONE) 200 mg/mL SOLN Inject 50 mg into a muscle once      thyroid desiccated (ARMOUR THYROID) 30 mg tablet Take 1 tablet by mouth daily       Zinc 50 MG TABS Take 2 tablets by mouth daily      ascorbic acid (VITAMIN C) 500 mg tablet Take 500 mg by mouth daily      hydrocortisone 2 5 % cream Apply 1 application topically 2 (two) times a day as needed To affected area  0    mupirocin (BACTROBAN) 2 % ointment Apply topically 3 (three) times a day (Patient not taking: Reported on 1/8/2020) 22 g 0     No current facility-administered medications for this visit  Objective:    /70   Pulse 86   Temp 97 7 °F (36 5 °C) (Temporal)   Resp 16   Ht 5' 8 5" (1 74 m)   Wt 91 2 kg (201 lb)   SpO2 96%   BMI 30 12 kg/m²        Physical Exam   Constitutional: He is oriented to person, place, and time  He appears well-developed and well-nourished  No distress  Musculoskeletal:        Right wrist: He exhibits normal range of motion, no tenderness, no bony tenderness, no swelling, no effusion, no crepitus and no deformity  Phalen test negative   Neurological: He is alert and oriented to person, place, and time  Skin: Skin is warm and dry  He is not diaphoretic  Psychiatric: He has a normal mood and affect   His behavior is normal  Judgment and thought content normal          BILL Rose

## 2020-01-09 LAB
CHOLEST SERPL-MCNC: 212 MG/DL (ref 100–199)
CHOLEST/HDLC SERPL: 3.7 RATIO (ref 0–5)
HDLC SERPL-MCNC: 57 MG/DL
LDLC SERPL CALC-MCNC: 137 MG/DL (ref 0–99)
SL AMB VLDL CHOLESTEROL CALC: 18 MG/DL (ref 5–40)
TRIGL SERPL-MCNC: 90 MG/DL (ref 0–149)

## 2020-01-16 ENCOUNTER — OFFICE VISIT (OUTPATIENT)
Dept: FAMILY MEDICINE CLINIC | Facility: CLINIC | Age: 45
End: 2020-01-16
Payer: COMMERCIAL

## 2020-01-16 VITALS
TEMPERATURE: 98.1 F | WEIGHT: 207.4 LBS | RESPIRATION RATE: 16 BRPM | OXYGEN SATURATION: 98 % | HEIGHT: 68 IN | SYSTOLIC BLOOD PRESSURE: 118 MMHG | HEART RATE: 109 BPM | BODY MASS INDEX: 31.43 KG/M2 | DIASTOLIC BLOOD PRESSURE: 80 MMHG

## 2020-01-16 DIAGNOSIS — J11.1 INFLUENZA: Primary | ICD-10-CM

## 2020-01-16 PROCEDURE — 1036F TOBACCO NON-USER: CPT | Performed by: NURSE PRACTITIONER

## 2020-01-16 PROCEDURE — 3725F SCREEN DEPRESSION PERFORMED: CPT | Performed by: NURSE PRACTITIONER

## 2020-01-16 PROCEDURE — 99213 OFFICE O/P EST LOW 20 MIN: CPT | Performed by: NURSE PRACTITIONER

## 2020-01-16 PROCEDURE — 3008F BODY MASS INDEX DOCD: CPT | Performed by: NURSE PRACTITIONER

## 2020-01-16 RX ORDER — OSELTAMIVIR PHOSPHATE 75 MG/1
75 CAPSULE ORAL EVERY 12 HOURS SCHEDULED
Qty: 10 CAPSULE | Refills: 0 | Status: SHIPPED | OUTPATIENT
Start: 2020-01-16 | End: 2020-01-21

## 2020-01-16 NOTE — PROGRESS NOTES
Assessment/Plan:    1  Influenza  Comments:  Symptoms consistent with influenza  Recommend fluids - water, gatorade and/or pedialyte  Orders:  -     oseltamivir (TAMIFLU) 75 mg capsule; Take 1 capsule (75 mg total) by mouth every 12 (twelve) hours for 5 days      Patient Instructions   Increase fluid intake as tolerated  Rest and humidification   Continue medications as directed   - tamiflu for full course  - pro-biotic to protect stomach while on medication   - Flonase OTC 1-2 sprays each nostril daily PRN post nasal drip   - Mucinex OTC to loosen secretions   Return to office in one week if symptoms persist or worsen        Return in about 1 week (around 1/23/2020), or if symptoms worsen or fail to improve  Subjective:      Patient ID: Hung Gale is a 40 y o  male  Chief Complaint   Patient presents with    Fever     intermittent since yesterday    Cough    Headache    chest congestion       Sisi David is a 40year old male who presents to the office for evaluation of sudden onset fatigue, headache, sore throat and cough x's 1 day  Reports ongoing fever with Tmax 101 F with intermittent chills  Denies shortness of breath, chest pain or wheezing, n/v/d  Fever   Associated symptoms include chills, coughing, fatigue, a fever, headaches and a sore throat  Pertinent negatives include no chest pain, congestion, diaphoresis, myalgias, nausea, rash or vomiting  Cough   Associated symptoms include chills, a fever, headaches and a sore throat  Pertinent negatives include no chest pain, ear pain, myalgias, postnasal drip, rash, rhinorrhea, shortness of breath or wheezing  Headache    Associated symptoms include coughing, a fever and a sore throat  Pertinent negatives include no dizziness, ear pain, eye pain, nausea, rhinorrhea, sinus pressure or vomiting         The following portions of the patient's history were reviewed and updated as appropriate: allergies, current medications, past family history, past medical history, past social history, past surgical history and problem list     Review of Systems   Constitutional: Positive for chills, fatigue and fever  Negative for diaphoresis  HENT: Positive for sore throat  Negative for congestion, ear discharge, ear pain, postnasal drip, rhinorrhea, sinus pressure and sinus pain  Eyes: Negative for pain and discharge  Respiratory: Positive for cough  Negative for chest tightness, shortness of breath and wheezing  Cardiovascular: Negative for chest pain  Gastrointestinal: Negative for diarrhea, nausea and vomiting  Genitourinary: Negative for dysuria  Musculoskeletal: Negative for myalgias  Skin: Negative for rash  Neurological: Positive for headaches  Negative for dizziness  Hematological: Negative for adenopathy           Current Outpatient Medications   Medication Sig Dispense Refill    ALPRAZolam (XANAX) 0 25 mg tablet TAKE 1 TABLET BY MOUTH TWICE A DAY AS NEEDED 60 tablet 1    anastrozole (ARIMIDEX) 1 mg tablet Take 1 mg by mouth 2 (two) times a week with meals      ascorbic acid (VITAMIN C) 500 mg tablet Take 500 mg by mouth daily      B-D DISP NEEDLE 25GX1" 25G X 1" MISC Use as directed  1    Calcium Carbonate-Vitamin D (CALCIUM 600+D PO) Take by mouth daily      Cholecalciferol (VITAMIN D3) 5000 units CAPS Take by mouth daily      clindamycin-benzoyl peroxide (BENZACLIN) gel APPLY BY TOPICAL ROUTE TO THE AFFECTED AREA(S) TWICE A DAY 50 g 3    desonide (DESOWEN) 0 05 % lotion Apply topically 2 (two) times a day 59 mL 0    ECHINACEA PO Take by mouth daily      esomeprazole (NexIUM) 40 MG capsule TAKE 1 CAPSULE DAILY 30 capsule 11    finasteride (PROPECIA) 1 MG tablet Take 1 tablet by mouth daily      Multiple Vitamin (MULTIVITAMIN) tablet Take 1 tablet by mouth daily      testosterone cypionate (DEPO-TESTOSTERONE) 200 mg/mL SOLN Inject 50 mg into a muscle once      thyroid desiccated (ARMOUR THYROID) 30 mg tablet Take 1 tablet by mouth daily       Zinc 50 MG TABS Take 2 tablets by mouth daily      oseltamivir (TAMIFLU) 75 mg capsule Take 1 capsule (75 mg total) by mouth every 12 (twelve) hours for 5 days 10 capsule 0     No current facility-administered medications for this visit  Objective:    /80   Pulse (!) 109   Temp 98 1 °F (36 7 °C) (Temporal)   Resp 16   Ht 5' 8" (1 727 m)   Wt 94 1 kg (207 lb 6 4 oz)   SpO2 98%   BMI 31 54 kg/m²        Physical Exam   Constitutional: He is oriented to person, place, and time  He appears well-developed and well-nourished  No distress  HENT:   Head: Normocephalic and atraumatic  Right Ear: Tympanic membrane, external ear and ear canal normal  No drainage, swelling or tenderness  No middle ear effusion  Left Ear: External ear and ear canal normal  No drainage, swelling or tenderness  Tympanic membrane is erythematous  No middle ear effusion  Nose: Mucosal edema and rhinorrhea present  No sinus tenderness  Right sinus exhibits no maxillary sinus tenderness and no frontal sinus tenderness  Left sinus exhibits no maxillary sinus tenderness and no frontal sinus tenderness  Mouth/Throat: Uvula is midline and mucous membranes are normal  Posterior oropharyngeal edema and posterior oropharyngeal erythema present  No oropharyngeal exudate  Eyes: Conjunctivae are normal  Right eye exhibits no discharge  Left eye exhibits no discharge  Neck: Normal range of motion  Neck supple  No thyromegaly present  Cardiovascular: Normal rate, regular rhythm and normal heart sounds  Pulmonary/Chest: Effort normal and breath sounds normal  No respiratory distress  He has no decreased breath sounds  He has no wheezes  He has no rhonchi  He has no rales  Lymphadenopathy:     He has no cervical adenopathy  Neurological: He is alert and oriented to person, place, and time  Skin: Skin is warm and dry  No rash noted  He is not diaphoretic  Psychiatric: He has a normal mood and affect  His behavior is normal  Thought content normal               BILL Rowe

## 2020-01-16 NOTE — PATIENT INSTRUCTIONS
Increase fluid intake as tolerated  Rest and humidification   Continue medications as directed   - tamiflu for full course  - pro-biotic to protect stomach while on medication   - Flonase OTC 1-2 sprays each nostril daily PRN post nasal drip   - Mucinex OTC to loosen secretions   Return to office in one week if symptoms persist or worsen

## 2020-02-27 DIAGNOSIS — L30.9 ECZEMA, UNSPECIFIED TYPE: ICD-10-CM

## 2020-02-27 DIAGNOSIS — L73.9 FOLLICULITIS: ICD-10-CM

## 2020-02-27 RX ORDER — DESONIDE 0.5 MG/ML
LOTION TOPICAL 2 TIMES DAILY
Qty: 59 ML | Refills: 0 | Status: SHIPPED | OUTPATIENT
Start: 2020-02-27 | End: 2020-02-28 | Stop reason: CLARIF

## 2020-02-28 ENCOUNTER — TELEPHONE (OUTPATIENT)
Dept: FAMILY MEDICINE CLINIC | Facility: CLINIC | Age: 45
End: 2020-02-28

## 2020-02-28 DIAGNOSIS — L30.9 ECZEMA, UNSPECIFIED TYPE: ICD-10-CM

## 2020-02-28 DIAGNOSIS — L73.9 FOLLICULITIS: Primary | ICD-10-CM

## 2020-02-28 RX ORDER — DESONIDE 0.5 MG/G
CREAM TOPICAL 2 TIMES DAILY
Qty: 30 G | Refills: 0 | Status: SHIPPED | OUTPATIENT
Start: 2020-02-28 | End: 2020-05-04

## 2020-02-28 NOTE — TELEPHONE ENCOUNTER
Left message to return my call -  Insurance Denied both my attempts for the prior auths on Desonide 0 05% lotion  The reason is as followed

## 2020-02-28 NOTE — TELEPHONE ENCOUNTER
When Veronica calls back, please notify him Carolyn Hill called in Desonide 0 05% cream instead to [address] [place] [address] for him to try

## 2020-03-03 NOTE — TELEPHONE ENCOUNTER
Spoke to Veronica  He states he got all the messages an picked up the Desonide cream   I informed him to keep us posted on how this works and Veronica agreed  No further action required at this time

## 2020-04-03 DIAGNOSIS — F41.9 ANXIETY: ICD-10-CM

## 2020-04-03 RX ORDER — ALPRAZOLAM 0.25 MG/1
TABLET ORAL
Qty: 60 TABLET | Refills: 1 | Status: SHIPPED | OUTPATIENT
Start: 2020-04-03 | End: 2020-07-20

## 2020-05-03 DIAGNOSIS — L30.9 ECZEMA, UNSPECIFIED TYPE: ICD-10-CM

## 2020-05-03 DIAGNOSIS — L73.9 FOLLICULITIS: ICD-10-CM

## 2020-05-04 RX ORDER — DESONIDE 0.5 MG/G
CREAM TOPICAL
Qty: 30 G | Refills: 0 | Status: SHIPPED | OUTPATIENT
Start: 2020-05-04 | End: 2020-07-20

## 2020-06-15 DIAGNOSIS — E78.1 HYPERTRIGLYCERIDEMIA: Primary | ICD-10-CM

## 2020-06-30 ENCOUNTER — TELEPHONE (OUTPATIENT)
Dept: FAMILY MEDICINE CLINIC | Facility: CLINIC | Age: 45
End: 2020-06-30

## 2020-06-30 NOTE — TELEPHONE ENCOUNTER
Uyen Estes would like to get the Antibody test done  He called his insurance company, they will cover the test as long as it is medically necessary  He will need a script  I asked him if they gave him the codes to use and they did not  He is going to call them back and ask for the codes    He will call us back

## 2020-07-17 DIAGNOSIS — F41.9 ANXIETY: ICD-10-CM

## 2020-07-17 DIAGNOSIS — L73.9 FOLLICULITIS: ICD-10-CM

## 2020-07-17 DIAGNOSIS — L30.9 ECZEMA, UNSPECIFIED TYPE: ICD-10-CM

## 2020-07-18 DIAGNOSIS — F41.9 ANXIETY: ICD-10-CM

## 2020-07-18 RX ORDER — ALPRAZOLAM 0.25 MG/1
TABLET ORAL
Qty: 60 TABLET | Refills: 1 | OUTPATIENT
Start: 2020-07-18 | End: 2020-08-17

## 2020-07-20 RX ORDER — DESONIDE 0.5 MG/G
CREAM TOPICAL
Qty: 30 G | Refills: 0 | Status: SHIPPED | OUTPATIENT
Start: 2020-07-20

## 2020-07-20 RX ORDER — ALPRAZOLAM 0.25 MG/1
TABLET ORAL
Qty: 60 TABLET | Refills: 0 | Status: SHIPPED | OUTPATIENT
Start: 2020-07-20 | End: 2020-08-26

## 2020-08-18 DIAGNOSIS — L30.9 ECZEMA, UNSPECIFIED TYPE: ICD-10-CM

## 2020-08-18 LAB
CHOLEST SERPL-MCNC: 174 MG/DL (ref 100–199)
CHOLEST/HDLC SERPL: 3.5 RATIO (ref 0–5)
HDLC SERPL-MCNC: 50 MG/DL
LDLC SERPL CALC-MCNC: 103 MG/DL (ref 0–99)
SL AMB VLDL CHOLESTEROL CALC: 21 MG/DL (ref 5–40)
TRIGL SERPL-MCNC: 105 MG/DL (ref 0–149)

## 2020-08-19 ENCOUNTER — OFFICE VISIT (OUTPATIENT)
Dept: FAMILY MEDICINE CLINIC | Facility: CLINIC | Age: 45
End: 2020-08-19
Payer: COMMERCIAL

## 2020-08-19 VITALS
DIASTOLIC BLOOD PRESSURE: 80 MMHG | HEART RATE: 95 BPM | WEIGHT: 195 LBS | TEMPERATURE: 97.8 F | OXYGEN SATURATION: 97 % | RESPIRATION RATE: 16 BRPM | HEIGHT: 68 IN | SYSTOLIC BLOOD PRESSURE: 130 MMHG | BODY MASS INDEX: 29.55 KG/M2

## 2020-08-19 DIAGNOSIS — E78.1 HYPERTRIGLYCERIDEMIA: Primary | ICD-10-CM

## 2020-08-19 PROCEDURE — 1036F TOBACCO NON-USER: CPT | Performed by: NURSE PRACTITIONER

## 2020-08-19 PROCEDURE — 99213 OFFICE O/P EST LOW 20 MIN: CPT | Performed by: NURSE PRACTITIONER

## 2020-08-19 PROCEDURE — 3008F BODY MASS INDEX DOCD: CPT | Performed by: NURSE PRACTITIONER

## 2020-08-19 RX ORDER — PHENTERMINE HYDROCHLORIDE 37.5 MG/1
37.5 TABLET ORAL DAILY PRN
COMMUNITY
Start: 2020-07-20

## 2020-08-19 RX ORDER — CLINDAMYCIN AND BENZOYL PEROXIDE 10; 50 MG/G; MG/G
GEL TOPICAL
Qty: 50 G | Refills: 3 | Status: SHIPPED | OUTPATIENT
Start: 2020-08-19 | End: 2021-06-09

## 2020-08-19 NOTE — ASSESSMENT & PLAN NOTE
Triglycerides now wnl  Pt has lost weight and reports he has improved exercise and nutrition  Encouraged him to continue  Recheck in 6 months

## 2020-08-19 NOTE — PROGRESS NOTES
Assessment/Plan:    1  Hypertriglyceridemia  Assessment & Plan:  Triglycerides now wnl  Pt has lost weight and reports he has improved exercise and nutrition  Encouraged him to continue  Recheck in 6 months  BMI Counseling: Body mass index is 30 09 kg/m²  The BMI is above normal  Nutrition recommendations include decreasing portion sizes, encouraging healthy choices of fruits and vegetables, decreasing fast food intake, consuming healthier snacks, limiting drinks that contain sugar, moderation in carbohydrate intake, increasing intake of lean protein, reducing intake of saturated and trans fat and reducing intake of cholesterol  Exercise recommendations include exercising 3-5 times per week  Patient Instructions   Continue nutrition and exercise as discussed      Return for Annual physical     Subjective:      Patient ID: Angelia Lizama is a 39 y o  male  Chief Complaint   Patient presents with    discussion     bloodwork results       Moiz Garcia is a 39year old male who presents to the office for discussion of recent lab work  Pt reports he has loss some weight and has changed his diet  States he is trying to eat healthy  Feeling well overall, no acute complaints today  The following portions of the patient's history were reviewed and updated as appropriate: allergies, current medications, past family history, past medical history, past social history, past surgical history and problem list     Review of Systems   Respiratory: Negative for cough, chest tightness and shortness of breath  Cardiovascular: Negative for chest pain           Current Outpatient Medications   Medication Sig Dispense Refill    ALPRAZolam (XANAX) 0 25 mg tablet TAKE 1 TABLET BY MOUTH TWICE A DAY AS NEEDED 60 tablet 0    anastrozole (ARIMIDEX) 1 mg tablet Take 1 mg by mouth 2 (two) times a week with meals      ascorbic acid (VITAMIN C) 500 mg tablet Take 500 mg by mouth daily      B-D DISP NEEDLE 25GX1" 25G X 1" MISC Use as directed  1    Calcium Carbonate-Vitamin D (CALCIUM 600+D PO) Take by mouth daily      Cholecalciferol (VITAMIN D3) 5000 units CAPS Take by mouth daily      clindamycin-benzoyl peroxide (BENZACLIN) gel APPLY BY TOPICAL ROUTE TO THE AFFECTED AREA(S) TWICE A DAY 50 g 3    desonide (DESOWEN) 0 05 % cream APPLY TO AFFECTED AREA TWICE A DAY 30 g 0    ECHINACEA PO Take by mouth daily      esomeprazole (NexIUM) 40 MG capsule TAKE 1 CAPSULE DAILY 30 capsule 11    finasteride (PROPECIA) 1 MG tablet Take 1 tablet by mouth daily      Multiple Vitamin (MULTIVITAMIN) tablet Take 1 tablet by mouth daily      phentermine (ADIPEX-P) 37 5 MG tablet Take 37 5 mg by mouth daily as needed      testosterone cypionate (DEPO-TESTOSTERONE) 200 mg/mL SOLN Inject 50 mg into a muscle once      thyroid desiccated (ARMOUR THYROID) 30 mg tablet Take 1 tablet by mouth daily       Zinc 50 MG TABS Take 2 tablets by mouth daily       No current facility-administered medications for this visit  Objective:    /80   Pulse 95   Temp 97 8 °F (36 6 °C) (Temporal)   Resp 16   Ht 5' 7 5" (1 715 m)   Wt 88 5 kg (195 lb)   SpO2 97%   BMI 30 09 kg/m²        Physical Exam  Constitutional:       General: He is not in acute distress  Appearance: He is well-developed  He is not diaphoretic  HENT:      Head: Normocephalic and atraumatic  Eyes:      General:         Right eye: No discharge  Left eye: No discharge  Conjunctiva/sclera: Conjunctivae normal    Neck:      Musculoskeletal: Normal range of motion and neck supple  Thyroid: No thyromegaly  Cardiovascular:      Rate and Rhythm: Normal rate and regular rhythm  Heart sounds: Normal heart sounds  Pulmonary:      Effort: Pulmonary effort is normal  No respiratory distress  Breath sounds: Normal breath sounds  No decreased breath sounds, wheezing, rhonchi or rales  Lymphadenopathy:      Cervical: No cervical adenopathy     Skin: General: Skin is warm and dry  Findings: No rash  Neurological:      Mental Status: He is alert and oriented to person, place, and time  Psychiatric:         Behavior: Behavior normal          Thought Content:  Thought content normal          Judgment: Judgment normal            BILL Machado

## 2020-08-23 DIAGNOSIS — K21.9 GASTROESOPHAGEAL REFLUX DISEASE WITHOUT ESOPHAGITIS: ICD-10-CM

## 2020-08-23 RX ORDER — ESOMEPRAZOLE MAGNESIUM 40 MG/1
CAPSULE, DELAYED RELEASE ORAL
Qty: 30 CAPSULE | Refills: 11 | Status: SHIPPED | OUTPATIENT
Start: 2020-08-23 | End: 2021-01-29 | Stop reason: SDUPTHER

## 2020-08-26 DIAGNOSIS — F41.9 ANXIETY: ICD-10-CM

## 2020-08-26 RX ORDER — ALPRAZOLAM 0.25 MG/1
TABLET ORAL
Qty: 60 TABLET | Refills: 0 | Status: SHIPPED | OUTPATIENT
Start: 2020-08-26 | End: 2020-10-18

## 2020-09-15 DIAGNOSIS — Z11.4 SCREENING FOR HUMAN IMMUNODEFICIENCY VIRUS: ICD-10-CM

## 2020-09-15 DIAGNOSIS — Z13.6 SCREENING FOR HYPERTENSION: ICD-10-CM

## 2020-09-15 DIAGNOSIS — Z13.1 SCREENING FOR DIABETES MELLITUS: ICD-10-CM

## 2020-09-15 DIAGNOSIS — Z13.29 SCREENING FOR THYROID DISORDER: ICD-10-CM

## 2020-09-15 DIAGNOSIS — Z00.00 ROUTINE GENERAL MEDICAL EXAMINATION AT A HEALTH CARE FACILITY: Primary | ICD-10-CM

## 2020-09-15 DIAGNOSIS — Z13.0 SCREENING FOR DEFICIENCY ANEMIA: ICD-10-CM

## 2020-09-15 RX ORDER — TAVABOROLE 43.5 MG/ML
1 SOLUTION TOPICAL DAILY
COMMUNITY
Start: 2020-08-21 | End: 2021-10-04 | Stop reason: ALTCHOICE

## 2020-10-01 ENCOUNTER — OFFICE VISIT (OUTPATIENT)
Dept: FAMILY MEDICINE CLINIC | Facility: CLINIC | Age: 45
End: 2020-10-01
Payer: COMMERCIAL

## 2020-10-01 VITALS
RESPIRATION RATE: 16 BRPM | HEART RATE: 103 BPM | TEMPERATURE: 97.8 F | HEIGHT: 68 IN | BODY MASS INDEX: 30.31 KG/M2 | WEIGHT: 200 LBS | SYSTOLIC BLOOD PRESSURE: 118 MMHG | DIASTOLIC BLOOD PRESSURE: 80 MMHG | OXYGEN SATURATION: 96 %

## 2020-10-01 DIAGNOSIS — Z13.0 SCREENING FOR DEFICIENCY ANEMIA: ICD-10-CM

## 2020-10-01 DIAGNOSIS — G47.9 SLEEP DISTURBANCE: ICD-10-CM

## 2020-10-01 DIAGNOSIS — F41.9 ANXIETY: ICD-10-CM

## 2020-10-01 DIAGNOSIS — R73.01 ELEVATED FASTING BLOOD SUGAR: ICD-10-CM

## 2020-10-01 DIAGNOSIS — E34.9 TESTOSTERONE DEFICIENCY: ICD-10-CM

## 2020-10-01 DIAGNOSIS — Z12.5 SCREENING FOR PROSTATE CANCER: ICD-10-CM

## 2020-10-01 DIAGNOSIS — Z23 NEED FOR INFLUENZA VACCINATION: ICD-10-CM

## 2020-10-01 DIAGNOSIS — Z00.00 ENCOUNTER FOR ANNUAL GENERAL MEDICAL EXAMINATION WITHOUT ABNORMAL FINDINGS IN ADULT: Primary | ICD-10-CM

## 2020-10-01 DIAGNOSIS — E78.1 HYPERTRIGLYCERIDEMIA: ICD-10-CM

## 2020-10-01 DIAGNOSIS — Z13.29 SCREENING FOR THYROID DISORDER: ICD-10-CM

## 2020-10-01 DIAGNOSIS — G47.33 OSA (OBSTRUCTIVE SLEEP APNEA): ICD-10-CM

## 2020-10-01 DIAGNOSIS — K21.00 GASTROESOPHAGEAL REFLUX DISEASE WITH ESOPHAGITIS WITHOUT HEMORRHAGE: ICD-10-CM

## 2020-10-01 DIAGNOSIS — Z13.1 SCREENING FOR DIABETES MELLITUS: ICD-10-CM

## 2020-10-01 PROBLEM — L73.9 FOLLICULITIS: Status: RESOLVED | Noted: 2019-10-08 | Resolved: 2020-10-01

## 2020-10-01 PROBLEM — M77.8 RIGHT WRIST TENDONITIS: Status: RESOLVED | Noted: 2020-01-08 | Resolved: 2020-10-01

## 2020-10-01 PROCEDURE — 99396 PREV VISIT EST AGE 40-64: CPT | Performed by: NURSE PRACTITIONER

## 2020-10-01 PROCEDURE — 3725F SCREEN DEPRESSION PERFORMED: CPT | Performed by: NURSE PRACTITIONER

## 2020-10-01 PROCEDURE — 90686 IIV4 VACC NO PRSV 0.5 ML IM: CPT

## 2020-10-01 PROCEDURE — 1036F TOBACCO NON-USER: CPT | Performed by: NURSE PRACTITIONER

## 2020-10-01 PROCEDURE — 90471 IMMUNIZATION ADMIN: CPT

## 2020-10-01 RX ORDER — PEN NEEDLE, DIABETIC 29 G X1/2"
NEEDLE, DISPOSABLE MISCELLANEOUS
COMMUNITY
Start: 2020-09-24

## 2020-10-01 RX ORDER — DOXEPIN HYDROCHLORIDE 10 MG/1
10 CAPSULE ORAL
COMMUNITY
Start: 2020-09-11 | End: 2021-10-04

## 2020-10-01 RX ORDER — CLINDAMYCIN HYDROCHLORIDE 300 MG/1
CAPSULE ORAL
COMMUNITY
Start: 2020-09-10 | End: 2021-10-04 | Stop reason: ALTCHOICE

## 2020-10-02 LAB
ALBUMIN SERPL-MCNC: 4.7 G/DL (ref 4–5)
ALBUMIN/GLOB SERPL: 1.7 {RATIO} (ref 1.2–2.2)
ALP SERPL-CCNC: 63 IU/L (ref 39–117)
ALT SERPL-CCNC: 38 IU/L (ref 0–44)
AST SERPL-CCNC: 25 IU/L (ref 0–40)
BASOPHILS # BLD AUTO: 0.1 X10E3/UL (ref 0–0.2)
BASOPHILS NFR BLD AUTO: 1 %
BILIRUB SERPL-MCNC: 0.4 MG/DL (ref 0–1.2)
BUN SERPL-MCNC: 16 MG/DL (ref 6–24)
BUN/CREAT SERPL: 15 (ref 9–20)
CALCIUM SERPL-MCNC: 9.7 MG/DL (ref 8.7–10.2)
CHLORIDE SERPL-SCNC: 101 MMOL/L (ref 96–106)
CO2 SERPL-SCNC: 26 MMOL/L (ref 20–29)
CREAT SERPL-MCNC: 1.09 MG/DL (ref 0.76–1.27)
EOSINOPHIL # BLD AUTO: 0.1 X10E3/UL (ref 0–0.4)
EOSINOPHIL NFR BLD AUTO: 1 %
ERYTHROCYTE [DISTWIDTH] IN BLOOD BY AUTOMATED COUNT: 14.2 % (ref 11.6–15.4)
EST. AVERAGE GLUCOSE BLD GHB EST-MCNC: 117 MG/DL
GLOBULIN SER-MCNC: 2.8 G/DL (ref 1.5–4.5)
GLUCOSE SERPL-MCNC: 99 MG/DL (ref 65–99)
HBA1C MFR BLD: 5.7 % (ref 4.8–5.6)
HCT VFR BLD AUTO: 44.6 % (ref 37.5–51)
HGB BLD-MCNC: 15.1 G/DL (ref 13–17.7)
IMM GRANULOCYTES # BLD: 0 X10E3/UL (ref 0–0.1)
IMM GRANULOCYTES NFR BLD: 0 %
LYMPHOCYTES # BLD AUTO: 1.9 X10E3/UL (ref 0.7–3.1)
LYMPHOCYTES NFR BLD AUTO: 29 %
MCH RBC QN AUTO: 30.7 PG (ref 26.6–33)
MCHC RBC AUTO-ENTMCNC: 33.9 G/DL (ref 31.5–35.7)
MCV RBC AUTO: 91 FL (ref 79–97)
MONOCYTES # BLD AUTO: 0.4 X10E3/UL (ref 0.1–0.9)
MONOCYTES NFR BLD AUTO: 7 %
NEUTROPHILS # BLD AUTO: 4.1 X10E3/UL (ref 1.4–7)
NEUTROPHILS NFR BLD AUTO: 62 %
PLATELET # BLD AUTO: 238 X10E3/UL (ref 150–450)
POTASSIUM SERPL-SCNC: 4.1 MMOL/L (ref 3.5–5.2)
PROT SERPL-MCNC: 7.5 G/DL (ref 6–8.5)
PSA SERPL-MCNC: 0.5 NG/ML (ref 0–4)
RBC # BLD AUTO: 4.92 X10E6/UL (ref 4.14–5.8)
SL AMB EGFR AFRICAN AMERICAN: 94 ML/MIN/1.73
SL AMB EGFR NON AFRICAN AMERICAN: 82 ML/MIN/1.73
SL AMB REFLEX CRITERIA: NORMAL
SODIUM SERPL-SCNC: 140 MMOL/L (ref 134–144)
TSH SERPL DL<=0.005 MIU/L-ACNC: 2.02 UIU/ML (ref 0.45–4.5)
WBC # BLD AUTO: 6.5 X10E3/UL (ref 3.4–10.8)

## 2020-10-16 DIAGNOSIS — F41.9 ANXIETY: ICD-10-CM

## 2020-10-18 RX ORDER — ALPRAZOLAM 0.25 MG/1
TABLET ORAL
Qty: 60 TABLET | Refills: 0 | Status: SHIPPED | OUTPATIENT
Start: 2020-10-18 | End: 2020-11-23

## 2020-11-22 DIAGNOSIS — F41.9 ANXIETY: ICD-10-CM

## 2020-11-23 RX ORDER — ALPRAZOLAM 0.25 MG/1
TABLET ORAL
Qty: 60 TABLET | Refills: 0 | Status: SHIPPED | OUTPATIENT
Start: 2020-11-23 | End: 2021-01-27

## 2021-01-27 DIAGNOSIS — F41.9 ANXIETY: ICD-10-CM

## 2021-01-27 RX ORDER — ALPRAZOLAM 0.25 MG/1
TABLET ORAL
Qty: 60 TABLET | Refills: 0 | Status: SHIPPED | OUTPATIENT
Start: 2021-01-27 | End: 2021-04-26

## 2021-01-29 DIAGNOSIS — K21.9 GASTROESOPHAGEAL REFLUX DISEASE WITHOUT ESOPHAGITIS: ICD-10-CM

## 2021-01-29 RX ORDER — ESOMEPRAZOLE MAGNESIUM 40 MG/1
40 CAPSULE, DELAYED RELEASE ORAL DAILY
Qty: 180 CAPSULE | Refills: 0 | Status: SHIPPED | OUTPATIENT
Start: 2021-01-29 | End: 2021-07-20

## 2021-01-29 NOTE — TELEPHONE ENCOUNTER
Christiano's insurance  Denied his esomeprazole due to he needs to try 2 formulary prescriptions with 180 days  Called the pharmacy and they were unable to tell me what the insurance will cover  Gabbymookie Otto will use good rx and pay out of pocket  Please send to [facility]    Its cheaper for him to get 180 tablets at a time

## 2021-04-25 DIAGNOSIS — F41.9 ANXIETY: ICD-10-CM

## 2021-04-26 RX ORDER — ALPRAZOLAM 0.25 MG/1
TABLET ORAL
Qty: 60 TABLET | Refills: 0 | Status: SHIPPED | OUTPATIENT
Start: 2021-04-26 | End: 2021-06-10

## 2021-06-09 DIAGNOSIS — L30.9 ECZEMA, UNSPECIFIED TYPE: ICD-10-CM

## 2021-06-09 RX ORDER — CLINDAMYCIN AND BENZOYL PEROXIDE 10; 50 MG/G; MG/G
GEL TOPICAL
Qty: 50 G | Refills: 3 | Status: SHIPPED | OUTPATIENT
Start: 2021-06-09 | End: 2022-03-07 | Stop reason: SDUPTHER

## 2021-06-10 DIAGNOSIS — F41.9 ANXIETY: ICD-10-CM

## 2021-06-10 RX ORDER — ALPRAZOLAM 0.25 MG/1
TABLET ORAL
Qty: 60 TABLET | Refills: 0 | Status: SHIPPED | OUTPATIENT
Start: 2021-06-10 | End: 2021-08-10

## 2021-07-13 DIAGNOSIS — Z13.220 SCREENING FOR LIPID DISORDERS: ICD-10-CM

## 2021-07-13 DIAGNOSIS — E78.1 HYPERTRIGLYCERIDEMIA: ICD-10-CM

## 2021-07-13 DIAGNOSIS — Z12.5 SCREENING FOR PROSTATE CANCER: ICD-10-CM

## 2021-07-13 DIAGNOSIS — Z11.59 NEED FOR HEPATITIS C SCREENING TEST: ICD-10-CM

## 2021-07-13 DIAGNOSIS — Z13.0 SCREENING FOR DEFICIENCY ANEMIA: ICD-10-CM

## 2021-07-13 DIAGNOSIS — Z13.29 SCREENING FOR THYROID DISORDER: ICD-10-CM

## 2021-07-13 DIAGNOSIS — Z00.00 ROUTINE GENERAL MEDICAL EXAMINATION AT A HEALTH CARE FACILITY: Primary | ICD-10-CM

## 2021-07-13 DIAGNOSIS — Z13.6 SCREENING FOR HYPERTENSION: ICD-10-CM

## 2021-07-13 DIAGNOSIS — Z11.4 SCREENING FOR HUMAN IMMUNODEFICIENCY VIRUS: ICD-10-CM

## 2021-07-13 DIAGNOSIS — R73.01 ELEVATED FASTING BLOOD SUGAR: ICD-10-CM

## 2021-07-13 DIAGNOSIS — Z13.1 SCREENING FOR DIABETES MELLITUS: ICD-10-CM

## 2021-07-20 DIAGNOSIS — K21.9 GASTROESOPHAGEAL REFLUX DISEASE WITHOUT ESOPHAGITIS: ICD-10-CM

## 2021-07-20 RX ORDER — TESTOSTERONE ENANTHATE 50 MG/.5ML
INJECTION SUBCUTANEOUS WEEKLY
COMMUNITY

## 2021-07-20 RX ORDER — IVERMECTIN 3 MG/1
TABLET ORAL
COMMUNITY
Start: 2021-07-20 | End: 2021-10-04

## 2021-07-20 RX ORDER — DICYCLOMINE HYDROCHLORIDE 10 MG/1
10 CAPSULE ORAL DAILY
COMMUNITY
Start: 2021-07-11

## 2021-07-20 RX ORDER — ESOMEPRAZOLE MAGNESIUM 40 MG/1
CAPSULE, DELAYED RELEASE ORAL
Qty: 180 CAPSULE | Refills: 0 | Status: SHIPPED | OUTPATIENT
Start: 2021-07-20 | End: 2021-12-21

## 2021-08-08 DIAGNOSIS — F41.9 ANXIETY: ICD-10-CM

## 2021-08-10 RX ORDER — ALPRAZOLAM 0.25 MG/1
TABLET ORAL
Qty: 60 TABLET | Refills: 0 | Status: SHIPPED | OUTPATIENT
Start: 2021-08-10 | End: 2021-09-13

## 2021-08-10 NOTE — TELEPHONE ENCOUNTER
Requested medication(s) are due for refill today: Yes  Patient has already received a courtesy refill: No  Other reason request has been forwarded to provider:  This refill cannot be delegated

## 2021-09-10 DIAGNOSIS — F41.9 ANXIETY: ICD-10-CM

## 2021-09-13 RX ORDER — ALPRAZOLAM 0.25 MG/1
TABLET ORAL
Qty: 60 TABLET | Refills: 0 | Status: SHIPPED | OUTPATIENT
Start: 2021-09-13 | End: 2021-10-25

## 2021-10-04 ENCOUNTER — OFFICE VISIT (OUTPATIENT)
Dept: FAMILY MEDICINE CLINIC | Facility: CLINIC | Age: 46
End: 2021-10-04
Payer: COMMERCIAL

## 2021-10-04 VITALS
TEMPERATURE: 97.9 F | HEIGHT: 68 IN | DIASTOLIC BLOOD PRESSURE: 88 MMHG | OXYGEN SATURATION: 97 % | HEART RATE: 76 BPM | BODY MASS INDEX: 30.92 KG/M2 | WEIGHT: 204 LBS | RESPIRATION RATE: 12 BRPM | SYSTOLIC BLOOD PRESSURE: 128 MMHG

## 2021-10-04 DIAGNOSIS — E03.9 ACQUIRED HYPOTHYROIDISM: ICD-10-CM

## 2021-10-04 DIAGNOSIS — E34.9 TESTOSTERONE DEFICIENCY: ICD-10-CM

## 2021-10-04 DIAGNOSIS — G47.33 OSA (OBSTRUCTIVE SLEEP APNEA): ICD-10-CM

## 2021-10-04 DIAGNOSIS — E78.1 HYPERTRIGLYCERIDEMIA: ICD-10-CM

## 2021-10-04 DIAGNOSIS — Z79.899 HIGH RISK MEDICATION USE: ICD-10-CM

## 2021-10-04 DIAGNOSIS — G47.9 SLEEP DISTURBANCE: ICD-10-CM

## 2021-10-04 DIAGNOSIS — Z12.11 ENCOUNTER FOR SCREENING FECAL OCCULT BLOOD TESTING: ICD-10-CM

## 2021-10-04 DIAGNOSIS — Z00.00 ENCOUNTER FOR ANNUAL GENERAL MEDICAL EXAMINATION WITHOUT ABNORMAL FINDINGS IN ADULT: Primary | ICD-10-CM

## 2021-10-04 DIAGNOSIS — F41.9 ANXIETY: ICD-10-CM

## 2021-10-04 DIAGNOSIS — E78.00 ELEVATED LDL CHOLESTEROL LEVEL: ICD-10-CM

## 2021-10-04 PROBLEM — S62.338A CLOSED FRACTURE OF NECK OF FIFTH METACARPAL BONE: Status: ACTIVE | Noted: 2019-07-31

## 2021-10-04 LAB — SL AMB POCT FECES OCC BLD: NEGATIVE

## 2021-10-04 PROCEDURE — 3008F BODY MASS INDEX DOCD: CPT | Performed by: NURSE PRACTITIONER

## 2021-10-04 PROCEDURE — 82270 OCCULT BLOOD FECES: CPT | Performed by: NURSE PRACTITIONER

## 2021-10-04 PROCEDURE — 1036F TOBACCO NON-USER: CPT | Performed by: NURSE PRACTITIONER

## 2021-10-04 PROCEDURE — 99396 PREV VISIT EST AGE 40-64: CPT | Performed by: NURSE PRACTITIONER

## 2021-10-04 PROCEDURE — 3725F SCREEN DEPRESSION PERFORMED: CPT | Performed by: NURSE PRACTITIONER

## 2021-10-24 DIAGNOSIS — F41.9 ANXIETY: ICD-10-CM

## 2021-10-25 RX ORDER — ALPRAZOLAM 0.25 MG/1
TABLET ORAL
Qty: 60 TABLET | Refills: 0 | Status: SHIPPED | OUTPATIENT
Start: 2021-10-25 | End: 2021-11-30

## 2021-11-29 DIAGNOSIS — F41.9 ANXIETY: ICD-10-CM

## 2021-11-30 RX ORDER — ALPRAZOLAM 0.25 MG/1
TABLET ORAL
Qty: 60 TABLET | Refills: 0 | Status: SHIPPED | OUTPATIENT
Start: 2021-11-30 | End: 2022-01-14

## 2021-12-20 DIAGNOSIS — K21.9 GASTROESOPHAGEAL REFLUX DISEASE WITHOUT ESOPHAGITIS: ICD-10-CM

## 2021-12-21 RX ORDER — ESOMEPRAZOLE MAGNESIUM 40 MG/1
CAPSULE, DELAYED RELEASE ORAL
Qty: 180 CAPSULE | Refills: 0 | Status: SHIPPED | OUTPATIENT
Start: 2021-12-21 | End: 2022-06-13

## 2022-01-13 DIAGNOSIS — F41.9 ANXIETY: ICD-10-CM

## 2022-01-14 RX ORDER — ALPRAZOLAM 0.25 MG/1
TABLET ORAL
Qty: 60 TABLET | Refills: 0 | Status: SHIPPED | OUTPATIENT
Start: 2022-01-14 | End: 2022-03-07 | Stop reason: SDUPTHER

## 2022-03-07 DIAGNOSIS — F41.9 ANXIETY: ICD-10-CM

## 2022-03-07 DIAGNOSIS — L30.9 ECZEMA, UNSPECIFIED TYPE: ICD-10-CM

## 2022-03-08 RX ORDER — ALPRAZOLAM 0.25 MG/1
0.25 TABLET ORAL 2 TIMES DAILY PRN
Qty: 60 TABLET | Refills: 0 | Status: SHIPPED | OUTPATIENT
Start: 2022-03-08 | End: 2022-05-02

## 2022-03-08 RX ORDER — CLINDAMYCIN AND BENZOYL PEROXIDE 10; 50 MG/G; MG/G
GEL TOPICAL DAILY
Qty: 50 G | Refills: 3 | Status: SHIPPED | OUTPATIENT
Start: 2022-03-08

## 2022-04-01 ENCOUNTER — RA CDI HCC (OUTPATIENT)
Dept: OTHER | Facility: HOSPITAL | Age: 47
End: 2022-04-01

## 2022-04-01 NOTE — PROGRESS NOTES
[facility] 75  coding opportunities       Chart reviewed, no opportunity found: CHART REVIEWED, NO OPPORTUNITY FOUND        Patients Insurance        Commercial Insurance: Trista Burleson

## 2022-04-11 ENCOUNTER — OFFICE VISIT (OUTPATIENT)
Dept: FAMILY MEDICINE CLINIC | Facility: CLINIC | Age: 47
End: 2022-04-11
Payer: COMMERCIAL

## 2022-04-11 VITALS
SYSTOLIC BLOOD PRESSURE: 116 MMHG | OXYGEN SATURATION: 97 % | BODY MASS INDEX: 31.07 KG/M2 | HEIGHT: 68 IN | RESPIRATION RATE: 12 BRPM | TEMPERATURE: 97.9 F | HEART RATE: 120 BPM | WEIGHT: 205 LBS | DIASTOLIC BLOOD PRESSURE: 80 MMHG

## 2022-04-11 DIAGNOSIS — E78.49 FAMILIAL HYPERLIPIDEMIA: ICD-10-CM

## 2022-04-11 DIAGNOSIS — G47.33 OSA (OBSTRUCTIVE SLEEP APNEA): ICD-10-CM

## 2022-04-11 DIAGNOSIS — R73.03 PRE-DIABETES: ICD-10-CM

## 2022-04-11 DIAGNOSIS — R73.01 ELEVATED FASTING BLOOD SUGAR: ICD-10-CM

## 2022-04-11 DIAGNOSIS — K21.00 GASTROESOPHAGEAL REFLUX DISEASE WITH ESOPHAGITIS WITHOUT HEMORRHAGE: ICD-10-CM

## 2022-04-11 DIAGNOSIS — F41.9 ANXIETY: ICD-10-CM

## 2022-04-11 DIAGNOSIS — G47.9 SLEEP DISTURBANCE: Primary | ICD-10-CM

## 2022-04-11 DIAGNOSIS — E34.9 TESTOSTERONE DEFICIENCY: ICD-10-CM

## 2022-04-11 DIAGNOSIS — E03.9 ACQUIRED HYPOTHYROIDISM: ICD-10-CM

## 2022-04-11 PROBLEM — S62.338A CLOSED FRACTURE OF NECK OF FIFTH METACARPAL BONE: Status: RESOLVED | Noted: 2019-07-31 | Resolved: 2022-04-11

## 2022-04-11 PROCEDURE — 99214 OFFICE O/P EST MOD 30 MIN: CPT | Performed by: NURSE PRACTITIONER

## 2022-04-11 PROCEDURE — 3008F BODY MASS INDEX DOCD: CPT | Performed by: NURSE PRACTITIONER

## 2022-04-11 PROCEDURE — 1036F TOBACCO NON-USER: CPT | Performed by: NURSE PRACTITIONER

## 2022-04-11 NOTE — PROGRESS NOTES
Assessment/Plan:    1  Sleep disturbance  Assessment & Plan:  Pt taking CBD gummies with mild improvement  Continues to have significant sleep issues  2  Acquired hypothyroidism  Assessment & Plan:  Reviewed labs - pt completed at outside facility  Following up with outside provider for testosterone management and thyroid management  TSH wnl, T4 slightly low  No changes at this time  [place] thyroid, tolerating without issues  3  HAYLIE (obstructive sleep apnea)  Assessment & Plan:  Reports compliance with CPAP use  4  Anxiety  Assessment & Plan:  Periodic use of alprazolam  Reports excessive work hours, encourage self care and healthy coping  5  Testosterone deficiency  Assessment & Plan:  Management via outside provider  Testosterone wnl  Reviewed recent labs  6  Elevated fasting blood sugar    7  Gastroesophageal reflux disease with esophagitis without hemorrhage  Assessment & Plan:  Symptoms well controlled on nexium 40 mg daily  Avoid trigger foods  Stable  8  Pre-diabetes  -     Hemoglobin A1C; Future  -     Hemoglobin A1C    9  Familial hyperlipidemia  -     CT coronary calcium score; Future; Expected date: 04/11/2022    BMI Counseling: Body mass index is 31 17 kg/m²  The BMI is above normal  Nutrition recommendations include encouraging healthy choices of fruits and vegetables  Exercise recommendations include exercising 3-5 times per week  Rationale for BMI follow-up plan is due to patient being overweight or obese  Return in about 3 months (around 7/11/2022) for Marilynn Nash 4  Subjective:      Patient ID: Hung Gale is a 52 y o  male  Chief Complaint   Patient presents with    Follow-up     Pt here for a lipid f/u  Duanenicole Arroyo is a 52year old male with testosterone deficiency, HAYLIE, hypothyroidism and hyperlipidemia who presents to the office for med check and discussion of recent labs  Pt reports sleep issues, falling and staying asleep   Reports working 7 days per week which contributes to his sleeping issues  States he has been compliant with CPAP use  Denies fever, chills, chest pain, shortness of breath, n/v/d  The following portions of the patient's history were reviewed and updated as appropriate: allergies, current medications, past family history, past medical history, past social history, past surgical history and problem list     Review of Systems   Constitutional: Positive for fatigue  Negative for chills and fever  Respiratory: Negative for cough, chest tightness and shortness of breath  Cardiovascular: Negative for chest pain, palpitations and leg swelling  Psychiatric/Behavioral: Positive for sleep disturbance  The patient is nervous/anxious (chronic, intermittent)            Current Outpatient Medications   Medication Sig Dispense Refill    ALPRAZolam (XANAX) 0 25 mg tablet Take 1 tablet (0 25 mg total) by mouth 2 (two) times a day as needed for anxiety 60 tablet 0    anastrozole (ARIMIDEX) 1 mg tablet Take 0 5 mg by mouth once a week        ascorbic acid (VITAMIN C) 500 mg tablet Take 500 mg by mouth daily      B-D DISP NEEDLE 25GX1" 25G X 1" MISC Use as directed  1    BD Insulin Syringe U/F 31G X 5/16" 1 ML MISC USE 1 SYRINGE DAILY AS DIRECTED      Calcium Carbonate-Vitamin D (CALCIUM 600+D PO) Take by mouth daily      Cholecalciferol (VITAMIN D3) 5000 units CAPS Take by mouth daily      clindamycin-benzoyl peroxide (BENZACLIN) gel Apply topically daily 50 g 3    desonide (DESOWEN) 0 05 % cream APPLY TO AFFECTED AREA TWICE A DAY (Patient taking differently: Apply topically as needed Apply the smallest amount that will cover affected area ) 30 g 0    dicyclomine (BENTYL) 10 mg capsule Take 10 mg by mouth in the morning        ECHINACEA PO Take by mouth daily      esomeprazole (NexIUM) 40 MG capsule TAKE ONE CAPSULE BY MOUTH ONE TIME DAILY 180 capsule 0    finasteride (PROPECIA) 1 MG tablet Take 1 tablet by mouth daily  Multiple Vitamin (MULTIVITAMIN) tablet Take 1 tablet by mouth daily      phentermine (ADIPEX-P) 37 5 MG tablet Take 37 5 mg by mouth daily as needed      QUERCETIN PO Take by mouth daily      Testosterone Enanthate (Xyosted) 50 MG/0 5ML SOAJ Inject under the skin Once a week      thyroid desiccated (ARMOUR THYROID) 30 mg tablet Take 1 tablet by mouth daily       Zinc 50 MG TABS Take 2 tablets by mouth daily       No current facility-administered medications for this visit  Objective:    /80 (BP Location: Right arm, Patient Position: Sitting, Cuff Size: Large)   Pulse (!) 120   Temp 97 9 °F (36 6 °C) (Temporal)   Resp 12   Ht 5' 8" (1 727 m)   Wt 93 kg (205 lb)   SpO2 97%   BMI 31 17 kg/m²        Physical Exam  Vitals reviewed  Constitutional:       General: He is not in acute distress  Appearance: He is well-developed  He is not diaphoretic  HENT:      Head: Normocephalic and atraumatic  Eyes:      General:         Right eye: No discharge  Left eye: No discharge  Conjunctiva/sclera: Conjunctivae normal    Neck:      Thyroid: No thyromegaly  Cardiovascular:      Rate and Rhythm: Normal rate and regular rhythm  Heart sounds: Normal heart sounds  Pulmonary:      Effort: Pulmonary effort is normal  No respiratory distress  Breath sounds: Normal breath sounds  No decreased breath sounds, wheezing, rhonchi or rales  Musculoskeletal:      Cervical back: Normal range of motion and neck supple  Lymphadenopathy:      Cervical: No cervical adenopathy  Skin:     General: Skin is warm and dry  Findings: No rash  Neurological:      Mental Status: He is alert and oriented to person, place, and time  Psychiatric:         Behavior: Behavior normal          Thought Content:  Thought content normal          Judgment: Judgment normal                 BILL Richards

## 2022-04-11 NOTE — ASSESSMENT & PLAN NOTE
Reviewed labs - pt completed at outside facility  Following up with outside provider for testosterone management and thyroid management  TSH wnl, T4 slightly low  No changes at this time  [place] thyroid, tolerating without issues

## 2022-05-01 DIAGNOSIS — F41.9 ANXIETY: ICD-10-CM

## 2022-05-02 RX ORDER — ALPRAZOLAM 0.25 MG/1
TABLET ORAL
Qty: 60 TABLET | Refills: 0 | Status: SHIPPED | OUTPATIENT
Start: 2022-05-02 | End: 2022-06-22

## 2022-05-25 ENCOUNTER — HOSPITAL ENCOUNTER (OUTPATIENT)
Dept: CT IMAGING | Facility: HOSPITAL | Age: 47
Discharge: HOME | End: 2022-05-25
Payer: COMMERCIAL

## 2022-05-25 DIAGNOSIS — E78.49 FAMILIAL HYPERLIPIDEMIA: ICD-10-CM

## 2022-05-25 PROCEDURE — 75571 CT HRT W/O DYE W/CA TEST: CPT

## 2022-05-25 PROCEDURE — G1004 CDSM NDSC: HCPCS

## 2022-06-13 DIAGNOSIS — K21.9 GASTROESOPHAGEAL REFLUX DISEASE WITHOUT ESOPHAGITIS: ICD-10-CM

## 2022-06-13 RX ORDER — ESOMEPRAZOLE MAGNESIUM 40 MG/1
CAPSULE, DELAYED RELEASE ORAL
Qty: 180 CAPSULE | Refills: 0 | Status: SHIPPED | OUTPATIENT
Start: 2022-06-13

## 2022-06-21 DIAGNOSIS — F41.9 ANXIETY: ICD-10-CM

## 2022-06-22 RX ORDER — ALPRAZOLAM 0.25 MG/1
TABLET ORAL
Qty: 60 TABLET | Refills: 0 | Status: SHIPPED | OUTPATIENT
Start: 2022-06-22

## 2022-07-12 ENCOUNTER — RA CDI HCC (OUTPATIENT)
Dept: OTHER | Facility: HOSPITAL | Age: 47
End: 2022-07-12

## 2022-07-12 NOTE — PROGRESS NOTES
[facility]  coding opportunities       Chart reviewed, no opportunity found: CHART REVIEWED, NO OPPORTUNITY FOUND        Patients Insurance        Commercial Insurance: [employer]

## 2022-08-02 ENCOUNTER — OFFICE VISIT (OUTPATIENT)
Dept: FAMILY MEDICINE CLINIC | Facility: CLINIC | Age: 47
End: 2022-08-02
Payer: COMMERCIAL

## 2022-08-02 ENCOUNTER — TELEPHONE (OUTPATIENT)
Dept: ADMINISTRATIVE | Facility: OTHER | Age: 47
End: 2022-08-02

## 2022-08-02 VITALS
RESPIRATION RATE: 12 BRPM | WEIGHT: 202 LBS | HEIGHT: 68 IN | BODY MASS INDEX: 30.62 KG/M2 | SYSTOLIC BLOOD PRESSURE: 124 MMHG | HEART RATE: 96 BPM | OXYGEN SATURATION: 97 % | TEMPERATURE: 97.7 F | DIASTOLIC BLOOD PRESSURE: 72 MMHG

## 2022-08-02 DIAGNOSIS — E34.9 TESTOSTERONE DEFICIENCY: ICD-10-CM

## 2022-08-02 DIAGNOSIS — Z12.5 SCREENING FOR PROSTATE CANCER: ICD-10-CM

## 2022-08-02 DIAGNOSIS — Z13.1 SCREENING FOR DIABETES MELLITUS: ICD-10-CM

## 2022-08-02 DIAGNOSIS — E78.49 FAMILIAL HYPERLIPIDEMIA: ICD-10-CM

## 2022-08-02 DIAGNOSIS — Z11.59 NEED FOR HEPATITIS C SCREENING TEST: ICD-10-CM

## 2022-08-02 DIAGNOSIS — Z11.4 SCREENING FOR HIV (HUMAN IMMUNODEFICIENCY VIRUS): ICD-10-CM

## 2022-08-02 DIAGNOSIS — Z00.00 PREVENTATIVE HEALTH CARE: ICD-10-CM

## 2022-08-02 DIAGNOSIS — E03.9 ACQUIRED HYPOTHYROIDISM: ICD-10-CM

## 2022-08-02 DIAGNOSIS — F41.9 ANXIETY: Primary | ICD-10-CM

## 2022-08-02 DIAGNOSIS — R73.03 PRE-DIABETES: ICD-10-CM

## 2022-08-02 DIAGNOSIS — Z13.0 SCREENING FOR DEFICIENCY ANEMIA: ICD-10-CM

## 2022-08-02 DIAGNOSIS — Z12.11 SCREENING FOR COLON CANCER: ICD-10-CM

## 2022-08-02 LAB — SL AMB POCT HEMOGLOBIN AIC: 5.5 (ref ?–6.5)

## 2022-08-02 PROCEDURE — 83036 HEMOGLOBIN GLYCOSYLATED A1C: CPT | Performed by: NURSE PRACTITIONER

## 2022-08-02 PROCEDURE — 99214 OFFICE O/P EST MOD 30 MIN: CPT | Performed by: NURSE PRACTITIONER

## 2022-08-02 RX ORDER — DOXEPIN HYDROCHLORIDE 10 MG/1
10 CAPSULE ORAL
COMMUNITY
Start: 2022-07-20

## 2022-08-02 NOTE — TELEPHONE ENCOUNTER
Upon review of the In Basket request and the patient's chart, initial outreach has been made via fax, please see Contacts section for details       Thank you  Kelechi Nayak MA

## 2022-08-02 NOTE — LETTER
Procedure Request Form: Colonoscopy      Date Requested: 22  Patient: [place] Branch  Patient : 1975   Referring Provider: Harris Bend, CRNP        Date of Procedure ______________________________       The above patient has informed us that they have completed their   most recent Colonoscopy at your facility  Please complete   this form and attach all corresponding procedure reports/results  Comments __________________________________________________________  ____________________________________________________________________  ____________________________________________________________________  ____________________________________________________________________    Facility Completing Procedure _________________________________________    Form Completed By (print name) _______________________________________      Signature __________________________________________________________      These reports are needed for  compliance  Please fax this completed form and a copy of the procedure report to our office located at [place] [address] as soon as possible to [phone] attention Prema: Phone [phone]    We thank you for your assistance in treating our mutual patient

## 2022-08-02 NOTE — PROGRESS NOTES
Assessment/Plan:    1  Anxiety  Assessment & Plan:  Symptoms stable  2  Testosterone deficiency  Assessment & Plan:  Following up with specialist  Stable, no changes today  3  Acquired hypothyroidism  Assessment & Plan:  Stable  Taking armour 30 mg daily  No changes today  Orders:  -     TSH, 3rd generation; Future    4  Familial hyperlipidemia  -     Lipid panel; Future    5  Pre-diabetes  -     POCT hemoglobin A1c    6  Screening for colon cancer  -     Ambulatory referral for colonoscopy; Future    7  Screening for HIV (human immunodeficiency virus)  -     [facility] and [facility]- Human Immunodeficiency Virus 1/2 Antigen / Antibody ( Fourth Generation) with Reflex Testing; Future    8  Need for hepatitis C screening test  -     Hepatitis C Antibody ([facility]); Future    9  Preventative health care  -     CBC and differential; Future  -     Comprehensive metabolic panel; Future  -     TSH, 3rd generation; Future  -     Lipid panel; Future  -     [facility] and [facility]- Human Immunodeficiency Virus 1/2 Antigen / Antibody ( Fourth Generation) with Reflex Testing; Future  -     Hepatitis C Antibody ([facility]); Future  -     PSA (Reflex To Free) (Serial); Future    10  Screening for deficiency anemia  -     CBC and differential; Future    11  Screening for diabetes mellitus  -     Comprehensive metabolic panel; Future  -     POCT hemoglobin A1c    12  Screening for prostate cancer  -     PSA (Reflex To Free) (Serial); Future          Return for Annual physical     Subjective:      Patient ID: Anne-Marie Carranza is a 52 y o  male  Chief Complaint   Patient presents with    Follow-up     Pt here for a three month f/u  Scott Toledo is a 52year old male with hyperlipidemia, anxiety, hypothyroidism and elevatad fasting sugar, who presents to the office for a recheck of A1c and discussion of cholesterol  Pt reports he has been doing OK overall  No new acute complaints today  The following portions of the patient's history were reviewed and updated as appropriate: allergies, current medications, past family history, past medical history, past social history, past surgical history and problem list     Review of Systems   Constitutional: Negative for chills, fatigue and fever  Respiratory: Negative for cough, chest tightness and shortness of breath  Cardiovascular: Negative for chest pain, palpitations and leg swelling           Current Outpatient Medications   Medication Sig Dispense Refill    ALPRAZolam (XANAX) 0 25 mg tablet TAKE 1 TABLET(0 25 MG) BY MOUTH TWICE DAILY AS NEEDED FOR ANXIETY 60 tablet 0    anastrozole (ARIMIDEX) 1 mg tablet Take 0 5 mg by mouth once a week        ascorbic acid (VITAMIN C) 500 mg tablet Take 500 mg by mouth daily      B-D DISP NEEDLE 25GX1" 25G X 1" MISC Use as directed  1    BD Insulin Syringe U/F 31G X 5/16" 1 ML MISC USE 1 SYRINGE DAILY AS DIRECTED      Calcium Carbonate-Vitamin D (CALCIUM 600+D PO) Take by mouth daily      Cholecalciferol (VITAMIN D3) 5000 units CAPS Take by mouth daily      clindamycin-benzoyl peroxide (BENZACLIN) gel Apply topically daily 50 g 3    desonide (DESOWEN) 0 05 % cream APPLY TO AFFECTED AREA TWICE A DAY (Patient taking differently: Apply topically as needed Apply the smallest amount that will cover affected area ) 30 g 0    dicyclomine (BENTYL) 10 mg capsule Take 10 mg by mouth in the morning        doxepin (SINEquan) 10 mg capsule Take 10 mg by mouth daily at bedtime      esomeprazole (NexIUM) 40 MG capsule TAKE ONE CAPSULE BY MOUTH ONE TIME DAILY 180 capsule 0    finasteride (PROPECIA) 1 MG tablet Take 1 tablet by mouth daily      Multiple Vitamin (MULTIVITAMIN) tablet Take 1 tablet by mouth daily      NON FORMULARY in the morning Immune supplement      QUERCETIN PO Take by mouth daily      Testosterone Enanthate (Xyosted) 50 MG/0 5ML SOAJ Inject under the skin Once a week      thyroid desiccated (ARMOUR) 30 mg tablet Take 1 tablet by mouth daily       Zinc 50 MG TABS Take 2 tablets by mouth daily      phentermine (ADIPEX-P) 37 5 MG tablet Take 37 5 mg by mouth daily as needed (Patient not taking: Reported on 8/2/2022)       No current facility-administered medications for this visit  Objective:    /72 (BP Location: Left arm, Patient Position: Sitting, Cuff Size: Large)   Pulse 96   Temp 97 7 °F (36 5 °C) (Temporal)   Resp 12   Ht 5' 8" (1 727 m)   Wt 91 6 kg (202 lb)   SpO2 97%   BMI 30 71 kg/m²        Physical Exam  Vitals reviewed  Constitutional:       Appearance: Normal appearance  HENT:      Head: Normocephalic and atraumatic  Cardiovascular:      Rate and Rhythm: Normal rate and regular rhythm  Pulses: Normal pulses  Heart sounds: Normal heart sounds  Pulmonary:      Breath sounds: Normal breath sounds  Neurological:      Mental Status: He is alert and oriented to person, place, and time     Psychiatric:         Mood and Affect: Mood normal                 BILL Scanlon

## 2022-08-04 NOTE — TELEPHONE ENCOUNTER
Upon review of the In Basket request we were able to locate, review, and update the patient chart as requested for CRC: Colonoscopy  Any additional questions or concerns should be emailed to the Practice Liaisons via [email or web address] email, please do not reply via In Basket      Thank you  Erica Gómez MA

## 2022-08-18 DIAGNOSIS — F41.9 ANXIETY: ICD-10-CM

## 2022-08-19 RX ORDER — ALPRAZOLAM 0.25 MG/1
TABLET ORAL
Qty: 60 TABLET | Refills: 0 | Status: SHIPPED | OUTPATIENT
Start: 2022-08-19 | End: 2022-10-18

## 2022-10-05 ENCOUNTER — RA CDI HCC (OUTPATIENT)
Dept: OTHER | Facility: HOSPITAL | Age: 47
End: 2022-10-05

## 2022-10-05 NOTE — PROGRESS NOTES
[facility] 75  coding opportunities          Chart Reviewed number of suggestions sent to Provider: 1  Depression     Patients Insurance        Commercial Insurance: [employer]

## 2022-10-17 DIAGNOSIS — F41.9 ANXIETY: ICD-10-CM

## 2022-10-18 RX ORDER — ALPRAZOLAM 0.25 MG/1
TABLET ORAL
Qty: 60 TABLET | Refills: 0 | Status: SHIPPED | OUTPATIENT
Start: 2022-10-18

## 2022-12-06 DIAGNOSIS — K21.9 GASTROESOPHAGEAL REFLUX DISEASE WITHOUT ESOPHAGITIS: ICD-10-CM

## 2022-12-06 RX ORDER — ESOMEPRAZOLE MAGNESIUM 40 MG/1
CAPSULE, DELAYED RELEASE ORAL
Qty: 180 CAPSULE | Refills: 0 | Status: SHIPPED | OUTPATIENT
Start: 2022-12-06

## 2022-12-12 DIAGNOSIS — F41.9 ANXIETY: ICD-10-CM

## 2022-12-13 RX ORDER — ALPRAZOLAM 0.25 MG/1
TABLET ORAL
Qty: 60 TABLET | Refills: 0 | Status: SHIPPED | OUTPATIENT
Start: 2022-12-13

## 2023-02-10 DIAGNOSIS — F41.9 ANXIETY: ICD-10-CM

## 2023-02-10 DIAGNOSIS — L30.9 ECZEMA, UNSPECIFIED TYPE: ICD-10-CM

## 2023-02-13 RX ORDER — CLINDAMYCIN AND BENZOYL PEROXIDE 10; 50 MG/G; MG/G
GEL TOPICAL
Qty: 50 G | Refills: 3 | Status: SHIPPED | OUTPATIENT
Start: 2023-02-13 | End: 2023-02-21 | Stop reason: SDUPTHER

## 2023-02-13 NOTE — TELEPHONE ENCOUNTER
Requested medication(s) are due for refill today: Yes  Patient has already received a courtesy refill: No  Other reason request has been forwarded to provider:  This refill cannot be delegated - appt made for 2/23/23

## 2023-02-14 RX ORDER — ALPRAZOLAM 0.25 MG/1
TABLET ORAL
Qty: 60 TABLET | Refills: 0 | Status: SHIPPED | OUTPATIENT
Start: 2023-02-14

## 2023-02-16 ENCOUNTER — RA CDI HCC (OUTPATIENT)
Dept: OTHER | Facility: HOSPITAL | Age: 48
End: 2023-02-16

## 2023-02-16 NOTE — PROGRESS NOTES
[facility] 75  coding opportunities       Chart reviewed, no opportunity found: CHART REVIEWED, NO OPPORTUNITY FOUND        Patients Insurance        Commercial Insurance: [employer]

## 2023-02-21 ENCOUNTER — TELEPHONE (OUTPATIENT)
Dept: FAMILY MEDICINE CLINIC | Facility: CLINIC | Age: 48
End: 2023-02-21

## 2023-02-21 DIAGNOSIS — L30.9 ECZEMA, UNSPECIFIED TYPE: ICD-10-CM

## 2023-02-21 DIAGNOSIS — L30.9 ECZEMA, UNSPECIFIED TYPE: Primary | ICD-10-CM

## 2023-02-21 RX ORDER — MOMETASONE FUROATE 1 MG/G
CREAM TOPICAL DAILY
Qty: 45 G | Refills: 0 | Status: SHIPPED | OUTPATIENT
Start: 2023-02-21

## 2023-02-21 RX ORDER — CLINDAMYCIN PHOSPHATE AND BENZOYL PEROXIDE 10; 50 MG/G; MG/G
GEL TOPICAL
Qty: 45 G | Refills: 1 | Status: SHIPPED | OUTPATIENT
Start: 2023-02-21

## 2023-02-21 NOTE — TELEPHONE ENCOUNTER
Rx sent to pharmacy  No further action required      Marilynn Nash [address] [place] [address]

## 2023-02-21 NOTE — TELEPHONE ENCOUNTER
Mike Pantoja saw mometasone is not for ingrown hairs and acne which is what he uses it for  He asked if you could call in the clindamyacin again to [facility] and he will use [facility]

## 2023-02-23 ENCOUNTER — TELEMEDICINE (OUTPATIENT)
Dept: FAMILY MEDICINE CLINIC | Facility: CLINIC | Age: 48
End: 2023-02-23

## 2023-02-23 VITALS — BODY MASS INDEX: 30.62 KG/M2 | HEIGHT: 68 IN | WEIGHT: 202 LBS

## 2023-02-23 DIAGNOSIS — G47.9 SLEEP DISTURBANCE: Primary | ICD-10-CM

## 2023-02-23 DIAGNOSIS — E03.9 ACQUIRED HYPOTHYROIDISM: ICD-10-CM

## 2023-02-23 DIAGNOSIS — F41.9 ANXIETY: ICD-10-CM

## 2023-02-23 DIAGNOSIS — M77.11 LATERAL EPICONDYLITIS OF RIGHT ELBOW: ICD-10-CM

## 2023-02-23 RX ORDER — TRIAMCINOLONE ACETONIDE 0.25 MG/ML
LOTION TOPICAL
COMMUNITY
Start: 2023-01-02

## 2023-02-23 RX ORDER — METRONIDAZOLE 375 MG/1
375 CAPSULE ORAL 3 TIMES DAILY
COMMUNITY
Start: 2023-01-14 | End: 2023-02-23 | Stop reason: ALTCHOICE

## 2023-02-23 RX ORDER — PREDNISONE 20 MG/1
TABLET ORAL
Qty: 11 TABLET | Refills: 0 | Status: SHIPPED | OUTPATIENT
Start: 2023-02-23

## 2023-02-23 NOTE — PROGRESS NOTES
Virtual Regular Visit    Verification of patient location:    Patient is located in the following state in which I hold an active license [place]      Assessment/Plan:    Problem List Items Addressed This Visit        Endocrine    Acquired hypothyroidism    Relevant Medications    predniSONE 20 mg tablet       Other    Anxiety     Intermittent use of alprazolam which is effective for management, per pt  Stable, no changes today  Sleep disturbance - Primary     Tolerating medications without issue  Periodic use of alprazolam    Stable, no changes today  Other Visit Diagnoses     Lateral epicondylitis of right elbow        Relevant Medications    predniSONE 20 mg tablet               Reason for visit is   Chief Complaint   Patient presents with   • Medication Management   • Virtual Regular Visit        Encounter provider Kimberly Aponte, [facility]    Provider located at [address] [place] [address]  [id number]      Recent Visits  Date Type Provider Dept   02/23/23 [address] [place] [address], [facility]   02/21/23 Telephone [address] [facility] [address] [facility]   Showing recent visits within past 7 days and meeting all other requirements  Future Appointments  No visits were found meeting these conditions  Showing future appointments within next 150 days and meeting all other requirements       The patient was identified by name and date of birth  Dejah Euceda was informed that this is a telemedicine visit and that the visit is being conducted through the [address] [employer] platform  He agrees to proceed     My office door was closed  No one else was in the room  He acknowledged consent and understanding of privacy and security of the video platform  The patient has agreed to participate and understands they can discontinue the visit at any time  Patient is aware this is a billable service       Mauro Crawford Isidro Hyde is a 52 y o  male who is scheduled for a virtual visit for a med check  History reviewed  No pertinent past medical history      Past Surgical History:   Procedure Laterality Date   • HERNIA REPAIR      Done at birth    • MOUTH SURGERY  02/2019    2 teeth   • SHOULDER SURGERY Right 12/2010       Current Outpatient Medications   Medication Sig Dispense Refill   • ALPRAZolam (XANAX) 0 25 mg tablet TAKE 1 TABLET(0 25 MG) BY MOUTH TWICE DAILY AS NEEDED FOR ANXIETY 60 tablet 0   • anastrozole (ARIMIDEX) 1 mg tablet Take 0 5 mg by mouth once a week       • ascorbic acid (VITAMIN C) 500 mg tablet Take 500 mg by mouth daily     • B-D DISP NEEDLE 25GX1" 25G X 1" MISC Use as directed  1   • BD Insulin Syringe U/F 31G X 5/16" 1 ML MISC USE 1 SYRINGE DAILY AS DIRECTED     • Calcium Carbonate-Vitamin D (CALCIUM 600+D PO) Take by mouth daily     • Cholecalciferol (VITAMIN D3) 5000 units CAPS Take by mouth daily     • Clindamycin Phos-Benzoyl Perox gel Apply topically to affected area 45 g 1   • desonide (DESOWEN) 0 05 % cream APPLY TO AFFECTED AREA TWICE A DAY (Patient taking differently: Apply topically as needed Apply the smallest amount that will cover affected area ) 30 g 0   • dicyclomine (BENTYL) 10 mg capsule Take 10 mg by mouth in the morning       • doxepin (SINEquan) 10 mg capsule Take 10 mg by mouth daily at bedtime     • esomeprazole (NexIUM) 40 MG capsule TAKE ONE CAPSULE BY MOUTH ONE TIME DAILY 180 capsule 0   • finasteride (PROPECIA) 1 MG tablet Take 1 tablet by mouth daily     • mometasone (ELOCON) 0 1 % cream Apply topically daily 45 g 0   • Multiple Vitamin (MULTIVITAMIN) tablet Take 1 tablet by mouth daily     • NON FORMULARY in the morning Immune supplement     • predniSONE 20 mg tablet Take 2 tablets PO daily x's 3 days, then take 1 tablet daily x's 3 days, then take 1/2 tablet daily x's 3 days 11 tablet 0   • QUERCETIN PO Take by mouth daily     • Testosterone Enanthate (Xyosted) 50 MG/0 5ML SOAJ Inject under the skin Once a week     • thyroid desiccated (ARMOUR) 30 mg tablet Take 1 tablet by mouth daily      • Triamcinolone Acetonide 0 025 % LOTN APPLY TOPICALLY TO THE AFFECTED AREA AT BEDTIME     • Zinc 50 MG TABS Take 2 tablets by mouth daily       No current facility-administered medications for this visit  Allergies   Allergen Reactions   • Itraconazole Other (See Comments)     unknown   • Penicillins Rash   • Sulfa Antibiotics Rash       Review of Systems   Constitutional: Negative for chills, fatigue and fever  Respiratory: Negative for shortness of breath  Cardiovascular: Negative for chest pain  Musculoskeletal: Positive for arthralgias (right arm, elbow)  Video Exam    Vitals:    02/23/23 1253   Weight: 91 6 kg (202 lb)   Height: 5' 8" (1 727 m)       Physical Exam  Vitals reviewed  Constitutional:       Appearance: Normal appearance  HENT:      Head: Normocephalic and atraumatic  Neurological:      Mental Status: He is alert and oriented to person, place, and time     Psychiatric:         Mood and Affect: Mood normal           I spent 20 minutes directly with the patient during this visit

## 2023-04-05 DIAGNOSIS — F41.9 ANXIETY: ICD-10-CM

## 2023-04-06 RX ORDER — ALPRAZOLAM 0.25 MG/1
TABLET ORAL
Qty: 60 TABLET | Refills: 0 | Status: SHIPPED | OUTPATIENT
Start: 2023-04-06

## 2023-05-18 DIAGNOSIS — K21.9 GASTROESOPHAGEAL REFLUX DISEASE WITHOUT ESOPHAGITIS: ICD-10-CM

## 2023-05-18 RX ORDER — ESOMEPRAZOLE MAGNESIUM 40 MG/1
CAPSULE, DELAYED RELEASE ORAL
Qty: 180 CAPSULE | Refills: 0 | Status: SHIPPED | OUTPATIENT
Start: 2023-05-18

## 2023-06-03 DIAGNOSIS — F41.9 ANXIETY: ICD-10-CM

## 2023-06-06 RX ORDER — ALPRAZOLAM 0.25 MG/1
TABLET ORAL
Qty: 60 TABLET | Refills: 0 | Status: SHIPPED | OUTPATIENT
Start: 2023-06-06

## 2023-06-07 DIAGNOSIS — L30.9 ECZEMA, UNSPECIFIED TYPE: ICD-10-CM

## 2023-06-07 RX ORDER — CLINDAMYCIN PHOSPHATE AND BENZOYL PEROXIDE 10; 50 MG/G; MG/G
GEL TOPICAL
Qty: 45 G | Refills: 1 | Status: SHIPPED | OUTPATIENT
Start: 2023-06-07

## 2023-07-29 DIAGNOSIS — F41.9 ANXIETY: ICD-10-CM

## 2023-07-31 RX ORDER — ALPRAZOLAM 0.25 MG/1
TABLET ORAL
Qty: 60 TABLET | OUTPATIENT
Start: 2023-07-31

## 2023-08-01 DIAGNOSIS — F41.9 ANXIETY: ICD-10-CM

## 2023-08-02 RX ORDER — ALPRAZOLAM 0.25 MG/1
0.25 TABLET ORAL 2 TIMES DAILY PRN
Qty: 60 TABLET | Refills: 0 | Status: SHIPPED | OUTPATIENT
Start: 2023-08-02

## 2023-08-02 NOTE — TELEPHONE ENCOUNTER
Requested medication(s) are due for refill today: Yes  Patient has already received a courtesy refill: No  Other reason request has been forwarded to provider: Refill cannot be delegates.  - Has an appointment on 8/8/23

## 2023-08-02 NOTE — TELEPHONE ENCOUNTER
Requested medication(s) are due for refill today: Yes - DUE 8/5/23  Patient has already received a courtesy refill: No  Other reason request has been forwarded to provider:  This refill cannot be delegated

## 2023-08-08 ENCOUNTER — OFFICE VISIT (OUTPATIENT)
Dept: FAMILY MEDICINE CLINIC | Facility: CLINIC | Age: 48
End: 2023-08-08
Payer: COMMERCIAL

## 2023-08-08 VITALS
HEART RATE: 72 BPM | RESPIRATION RATE: 12 BRPM | BODY MASS INDEX: 31.07 KG/M2 | OXYGEN SATURATION: 97 % | WEIGHT: 205 LBS | DIASTOLIC BLOOD PRESSURE: 78 MMHG | HEIGHT: 68 IN | SYSTOLIC BLOOD PRESSURE: 110 MMHG | TEMPERATURE: 97.2 F

## 2023-08-08 DIAGNOSIS — E78.49 FAMILIAL HYPERLIPIDEMIA: ICD-10-CM

## 2023-08-08 DIAGNOSIS — R73.03 PRE-DIABETES: ICD-10-CM

## 2023-08-08 DIAGNOSIS — Z11.59 NEED FOR HEPATITIS C SCREENING TEST: ICD-10-CM

## 2023-08-08 DIAGNOSIS — F41.9 ANXIETY: Primary | ICD-10-CM

## 2023-08-08 DIAGNOSIS — Z13.0 SCREENING FOR DEFICIENCY ANEMIA: ICD-10-CM

## 2023-08-08 DIAGNOSIS — M77.11 LATERAL EPICONDYLITIS OF RIGHT ELBOW: ICD-10-CM

## 2023-08-08 DIAGNOSIS — Z13.1 SCREENING FOR DIABETES MELLITUS: ICD-10-CM

## 2023-08-08 DIAGNOSIS — Z11.4 SCREENING FOR HIV (HUMAN IMMUNODEFICIENCY VIRUS): ICD-10-CM

## 2023-08-08 DIAGNOSIS — Z12.5 SCREENING FOR PROSTATE CANCER: ICD-10-CM

## 2023-08-08 DIAGNOSIS — Z13.29 SCREENING FOR THYROID DISORDER: ICD-10-CM

## 2023-08-08 PROCEDURE — 99214 OFFICE O/P EST MOD 30 MIN: CPT | Performed by: NURSE PRACTITIONER

## 2023-08-08 NOTE — PROGRESS NOTES
Assessment/Plan:    1. Anxiety  Assessment & Plan:  Pt is taking alprazolam 0.25 mg BID PRN  Stable, no issues at this time. 2. Lateral epicondylitis of right elbow  -     XR elbow 3+ vw right; Future; Expected date: 08/08/2023    3. Need for hepatitis C screening test  -     Hepatitis C Antibody; Future    4. Screening for HIV (human immunodeficiency virus)  -     HIV 1/2 Antigen/Antibody (Fourth Generation) with Reflex Testing ([facility], or [facility]); Future    5. Screening for deficiency anemia  -     CBC and differential; Future    6. Screening for diabetes mellitus  -     Comprehensive metabolic panel; Future    7. Familial hyperlipidemia  -     Lipid panel; Future    8. Pre-diabetes  -     Comprehensive metabolic panel; Future    9. Screening for thyroid disorder  -     TSH, 3rd generation; Future    10. Screening for prostate cancer  -     PSA (Reflex To Free) (Serial); Future                Return in about 3 months (around 11/8/2023) for Med Check. Subjective:      Patient ID: Mk Anaya is a 50 y.o. male. Chief Complaint   Patient presents with   • Medication Management       Leticia Booth is a 50year old male with anxiety who presents to the office for a med check. Reports that he has been having right sided elbow pain that was resolved with a course of prednisone. States that his pain has improved overall, but he still has point tenderness of his right elbow. Reports his anxiety has been well controlled. The following portions of the patient's history were reviewed and updated as appropriate: allergies, current medications, past family history, past medical history, past social history, past surgical history and problem list.    Review of Systems   Constitutional: Negative for chills, fatigue and fever. Respiratory: Negative for cough and shortness of breath. Cardiovascular: Negative for chest pain, palpitations and leg swelling.          Current Outpatient Medications Medication Sig Dispense Refill   • ALPRAZolam (XANAX) 0.25 mg tablet Take 1 tablet (0.25 mg total) by mouth 2 (two) times a day as needed for anxiety 60 tablet 0   • anastrozole (ARIMIDEX) 1 mg tablet Take 0.5 mg by mouth once a week       • ascorbic acid (VITAMIN C) 500 mg tablet Take 500 mg by mouth daily     • B-D DISP NEEDLE 25GX1" 25G X 1" MISC Use as directed  1   • BD Insulin Syringe U/F 31G X 5/16" 1 ML MISC USE 1 SYRINGE DAILY AS DIRECTED     • Calcium Carbonate-Vitamin D (CALCIUM 600+D PO) Take by mouth daily     • Cholecalciferol (VITAMIN D3) 5000 units CAPS Take by mouth daily     • Clindamycin Phos-Benzoyl Perox gel APPLY TO THE AFFECTED AREA(S) OF SKIN 45 g 1   • desonide (DESOWEN) 0.05 % cream APPLY TO AFFECTED AREA TWICE A DAY (Patient taking differently: Apply topically as needed Apply the smallest amount that will cover affected area.) 30 g 0   • dicyclomine (BENTYL) 10 mg capsule Take 10 mg by mouth in the morning       • doxepin (SINEquan) 10 mg capsule Take 10 mg by mouth daily at bedtime     • esomeprazole (NexIUM) 40 MG capsule TAKE ONE CAPSULE BY MOUTH ONE TIME DAILY 180 capsule 0   • finasteride (PROPECIA) 1 MG tablet Take 1 tablet by mouth daily     • Multiple Vitamin (MULTIVITAMIN) tablet Take 1 tablet by mouth daily     • NON FORMULARY in the morning Immune supplement     • QUERCETIN PO Take by mouth daily     • Testosterone Enanthate (Xyosted) 50 MG/0.5ML SOAJ Inject under the skin Once a week     • thyroid desiccated (ARMOUR) 30 mg tablet Take 1 tablet by mouth daily      • Zinc 50 MG TABS Take 2 tablets by mouth daily     • mometasone (ELOCON) 0.1 % cream Apply topically daily (Patient not taking: Reported on 8/8/2023) 45 g 0     No current facility-administered medications for this visit.        Objective:    /78 (BP Location: Left arm, Patient Position: Sitting, Cuff Size: Large)   Pulse 72   Temp (!) 97.2 °F (36.2 °C) (Temporal)   Resp 12   Ht 5' 8" (1.727 m)   Wt 93 kg (205 lb)   SpO2 97%   BMI 31.17 kg/m²        Physical Exam  Vitals reviewed. Constitutional:       Appearance: Normal appearance. HENT:      Head: Normocephalic and atraumatic. Cardiovascular:      Rate and Rhythm: Regular rhythm. Heart sounds: Normal heart sounds. Pulmonary:      Breath sounds: Normal breath sounds. Neurological:      Mental Status: He is alert and oriented to person, place, and time.    Psychiatric:         Mood and Affect: Mood normal.                BILL Fall

## 2023-09-23 DIAGNOSIS — F41.9 ANXIETY: ICD-10-CM

## 2023-09-25 RX ORDER — ALPRAZOLAM 0.25 MG/1
TABLET ORAL
Qty: 60 TABLET | Refills: 0 | Status: SHIPPED | OUTPATIENT
Start: 2023-09-25

## 2023-10-11 DIAGNOSIS — L30.9 ECZEMA, UNSPECIFIED TYPE: ICD-10-CM

## 2023-10-12 RX ORDER — CLINDAMYCIN PHOSPHATE AND BENZOYL PEROXIDE 10; 50 MG/G; MG/G
GEL TOPICAL
Qty: 45 G | Refills: 1 | Status: SHIPPED | OUTPATIENT
Start: 2023-10-12

## 2023-10-24 DIAGNOSIS — K21.9 GASTROESOPHAGEAL REFLUX DISEASE WITHOUT ESOPHAGITIS: ICD-10-CM

## 2023-10-24 RX ORDER — ESOMEPRAZOLE MAGNESIUM 40 MG/1
CAPSULE, DELAYED RELEASE ORAL
Qty: 180 CAPSULE | Refills: 0 | Status: SHIPPED | OUTPATIENT
Start: 2023-10-24

## 2023-11-09 ENCOUNTER — RA CDI HCC (OUTPATIENT)
Dept: OTHER | Facility: HOSPITAL | Age: 48
End: 2023-11-09

## 2023-11-09 NOTE — PROGRESS NOTES
[address] [facility] coding opportunities          Chart Reviewed number of suggestions sent to Provider: 1  Depression     Patients Insurance        Commercial Insurance: [address]

## 2023-11-12 DIAGNOSIS — F41.9 ANXIETY: ICD-10-CM

## 2023-11-13 RX ORDER — ALPRAZOLAM 0.25 MG/1
TABLET ORAL
Qty: 60 TABLET | Refills: 0 | Status: SHIPPED | OUTPATIENT
Start: 2023-11-13

## 2024-01-11 DIAGNOSIS — F41.9 ANXIETY: ICD-10-CM

## 2024-01-12 RX ORDER — ALPRAZOLAM 0.25 MG/1
TABLET ORAL
Qty: 60 TABLET | Refills: 0 | Status: SHIPPED | OUTPATIENT
Start: 2024-01-12

## 2024-02-09 DIAGNOSIS — L30.9 ECZEMA, UNSPECIFIED TYPE: ICD-10-CM

## 2024-02-10 RX ORDER — CLINDAMYCIN PHOSPHATE AND BENZOYL PEROXIDE 10; 50 MG/G; MG/G
GEL TOPICAL
Qty: 45 G | Refills: 1 | Status: SHIPPED | OUTPATIENT
Start: 2024-02-10

## 2024-03-08 DIAGNOSIS — F41.9 ANXIETY: ICD-10-CM

## 2024-03-08 NOTE — TELEPHONE ENCOUNTER
Christiano is scheduled for 3/28 for just a med check.  Tried to get him to schedule the CPX that he missed but he did not want to.

## 2024-03-11 RX ORDER — ALPRAZOLAM 0.25 MG/1
TABLET ORAL
Qty: 60 TABLET | Refills: 0 | Status: SHIPPED | OUTPATIENT
Start: 2024-03-11

## 2024-03-19 DIAGNOSIS — K21.9 GASTROESOPHAGEAL REFLUX DISEASE WITHOUT ESOPHAGITIS: ICD-10-CM

## 2024-03-19 RX ORDER — ESOMEPRAZOLE MAGNESIUM 40 MG/1
CAPSULE, DELAYED RELEASE ORAL
Qty: 180 CAPSULE | Refills: 0 | Status: SHIPPED | OUTPATIENT
Start: 2024-03-19

## 2024-03-28 ENCOUNTER — OFFICE VISIT (OUTPATIENT)
Dept: FAMILY MEDICINE CLINIC | Facility: CLINIC | Age: 49
End: 2024-03-28
Payer: COMMERCIAL

## 2024-03-28 VITALS
WEIGHT: 210 LBS | BODY MASS INDEX: 31.83 KG/M2 | SYSTOLIC BLOOD PRESSURE: 112 MMHG | DIASTOLIC BLOOD PRESSURE: 80 MMHG | HEART RATE: 94 BPM | TEMPERATURE: 97.3 F | RESPIRATION RATE: 14 BRPM | OXYGEN SATURATION: 93 % | HEIGHT: 68 IN

## 2024-03-28 DIAGNOSIS — G47.9 SLEEP DISTURBANCE: ICD-10-CM

## 2024-03-28 DIAGNOSIS — Z12.5 SCREENING FOR PROSTATE CANCER: ICD-10-CM

## 2024-03-28 DIAGNOSIS — Z13.29 SCREENING FOR THYROID DISORDER: ICD-10-CM

## 2024-03-28 DIAGNOSIS — E34.9 TESTOSTERONE DEFICIENCY: ICD-10-CM

## 2024-03-28 DIAGNOSIS — Z13.0 SCREENING FOR DEFICIENCY ANEMIA: ICD-10-CM

## 2024-03-28 DIAGNOSIS — R20.0 LEFT ARM NUMBNESS: ICD-10-CM

## 2024-03-28 DIAGNOSIS — Z13.1 SCREENING FOR DIABETES MELLITUS: ICD-10-CM

## 2024-03-28 DIAGNOSIS — M25.512 ACUTE PAIN OF LEFT SHOULDER: Primary | ICD-10-CM

## 2024-03-28 DIAGNOSIS — E78.49 FAMILIAL HYPERLIPIDEMIA: ICD-10-CM

## 2024-03-28 DIAGNOSIS — R73.03 PRE-DIABETES: ICD-10-CM

## 2024-03-28 PROCEDURE — 99213 OFFICE O/P EST LOW 20 MIN: CPT | Performed by: NURSE PRACTITIONER

## 2024-03-28 NOTE — PROGRESS NOTES
Assessment/Plan:    1. Acute pain of left shoulder  Comments:  Recommend gentle stretching exercises. Can consider PT if pain persists.    2. Left arm numbness    3. Sleep disturbance  Assessment & Plan:  Taking alprazolam 0.25 mg nightly.      4. Familial hyperlipidemia  -     Lipid panel; Future    5. Pre-diabetes  Assessment & Plan:  Encourage nutrition and exercise. Encourage weight loss.       6. Testosterone deficiency  -     Testosterone; Future    7. Screening for deficiency anemia  -     CBC and differential; Future    8. Screening for diabetes mellitus  -     Comprehensive metabolic panel; Future    9. Screening for thyroid disorder  -     TSH, 3rd generation with Free T4 reflex    10. Screening for prostate cancer  -     PSA (Reflex To Free) (Serial); Future            Return for Annual physical.    Subjective:      Patient ID: Stevie Locke is a 49 y.o. male.    Chief Complaint   Patient presents with    Medication Management     Pt here for med check for refills       Christiano is a 49 year old male with sleep disturbance, testosterone deficiency, familial hyperlipidemia and prediabetes who presents to the office for a med check. Reports that he has been experiencing left shoulder pain without known injury and subsequent left arm numbness intermittently. Denies fever, chills, chest pain, SOB.         The following portions of the patient's history were reviewed and updated as appropriate: allergies, current medications, past family history, past medical history, past social history, past surgical history and problem list.    Review of Systems   Constitutional:  Negative for chills, fatigue and fever.   Respiratory:  Negative for cough, chest tightness and shortness of breath.    Cardiovascular:  Negative for chest pain.   Musculoskeletal:  Positive for arthralgias (left shoulder).   Neurological:  Positive for numbness (left arm, intermittent).         Current Outpatient Medications   Medication Sig  "Dispense Refill    ALPRAZolam (XANAX) 0.25 mg tablet TAKE 1 TABLET(0.25 MG) BY MOUTH TWICE DAILY AS NEEDED FOR ANXIETY 60 tablet 0    anastrozole (ARIMIDEX) 1 mg tablet Take 0.5 mg by mouth once a week        ascorbic acid (VITAMIN C) 500 mg tablet Take 500 mg by mouth daily      B-D DISP NEEDLE 25GX1\" 25G X 1\" MISC Use as directed  1    BD Insulin Syringe U/F 31G X 5/16\" 1 ML MISC USE 1 SYRINGE DAILY AS DIRECTED      Calcium Carbonate-Vitamin D (CALCIUM 600+D PO) Take by mouth daily      Cholecalciferol (VITAMIN D3) 5000 units CAPS Take by mouth daily      Clindamycin Phos-Benzoyl Perox gel APPLY TO THE AFFECTED AREA(S) OF SKIN ONCE DAILY AS DIRECTED 45 g 1    dicyclomine (BENTYL) 10 mg capsule Take 10 mg by mouth in the morning        doxepin (SINEquan) 10 mg capsule Take 10 mg by mouth daily at bedtime      esomeprazole (NexIUM) 40 MG capsule TAKE ONE CAPSULE BY MOUTH ONE TIME DAILY 180 capsule 0    finasteride (PROPECIA) 1 MG tablet Take 1 tablet by mouth daily      Multiple Vitamin (MULTIVITAMIN) tablet Take 1 tablet by mouth daily      NON FORMULARY in the morning Immune supplement      NON FORMULARY APPLY topically TO SCALP TWICE DAILY      QUERCETIN PO Take by mouth daily      Testosterone Enanthate (Xyosted) 50 MG/0.5ML SOAJ Inject under the skin Once a week      thyroid desiccated (ARMOUR) 30 mg tablet Take 1 tablet by mouth daily       Zinc 50 MG TABS Take 2 tablets by mouth daily      desonide (DESOWEN) 0.05 % cream APPLY TO AFFECTED AREA TWICE A DAY (Patient not taking: Reported on 3/28/2024) 30 g 0     No current facility-administered medications for this visit.       Objective:    /80 (BP Location: Left arm, Patient Position: Sitting, Cuff Size: Large)   Pulse 94   Temp (!) 97.3 °F (36.3 °C) (Temporal)   Resp 14   Ht 5' 8\" (1.727 m)   Wt 95.3 kg (210 lb)   SpO2 93%   BMI 31.93 kg/m²        Physical Exam  Vitals reviewed.   Constitutional:       Appearance: Normal appearance.   HENT:      " Head: Normocephalic and atraumatic.   Cardiovascular:      Rate and Rhythm: Normal rate and regular rhythm.      Heart sounds: Normal heart sounds.   Pulmonary:      Breath sounds: Normal breath sounds.   Neurological:      Mental Status: He is alert and oriented to person, place, and time.   Psychiatric:         Mood and Affect: Mood normal.                BILL Fernandes

## 2024-04-01 PROBLEM — R73.03 PRE-DIABETES: Status: ACTIVE | Noted: 2024-04-01

## 2024-04-01 PROBLEM — E78.49 FAMILIAL HYPERLIPIDEMIA: Status: ACTIVE | Noted: 2019-10-08

## 2024-05-04 DIAGNOSIS — F41.9 ANXIETY: ICD-10-CM

## 2024-05-06 RX ORDER — ALPRAZOLAM 0.25 MG/1
TABLET ORAL
Qty: 60 TABLET | Refills: 0 | Status: SHIPPED | OUTPATIENT
Start: 2024-05-06

## 2024-06-18 DIAGNOSIS — L30.9 ECZEMA, UNSPECIFIED TYPE: ICD-10-CM

## 2024-06-19 RX ORDER — CLINDAMYCIN PHOSPHATE AND BENZOYL PEROXIDE 10; 50 MG/G; MG/G
GEL TOPICAL
Qty: 45 G | Refills: 1 | Status: SHIPPED | OUTPATIENT
Start: 2024-06-19

## 2024-06-26 DIAGNOSIS — F41.9 ANXIETY: ICD-10-CM

## 2024-06-27 RX ORDER — ALPRAZOLAM 0.25 MG/1
TABLET ORAL
Qty: 60 TABLET | Refills: 0 | Status: SHIPPED | OUTPATIENT
Start: 2024-06-27

## 2024-07-24 ENCOUNTER — RA CDI HCC (OUTPATIENT)
Dept: OTHER | Facility: HOSPITAL | Age: 49
End: 2024-07-24

## 2024-07-24 LAB — HBA1C MFR BLD HPLC: 5.6 %

## 2024-07-24 NOTE — PROGRESS NOTES
HCC coding opportunities       Chart reviewed, no opportunity found: CHART REVIEWED, NO OPPORTUNITY FOUND        Patients Insurance        Commercial Insurance: [employer]

## 2024-07-31 ENCOUNTER — OFFICE VISIT (OUTPATIENT)
Dept: FAMILY MEDICINE CLINIC | Facility: CLINIC | Age: 49
End: 2024-07-31
Payer: COMMERCIAL

## 2024-07-31 VITALS
OXYGEN SATURATION: 98 % | HEIGHT: 68 IN | RESPIRATION RATE: 16 BRPM | SYSTOLIC BLOOD PRESSURE: 124 MMHG | WEIGHT: 212.6 LBS | TEMPERATURE: 98.1 F | BODY MASS INDEX: 32.22 KG/M2 | HEART RATE: 92 BPM | DIASTOLIC BLOOD PRESSURE: 70 MMHG

## 2024-07-31 DIAGNOSIS — R73.03 PRE-DIABETES: ICD-10-CM

## 2024-07-31 DIAGNOSIS — E34.9 TESTOSTERONE DEFICIENCY: ICD-10-CM

## 2024-07-31 DIAGNOSIS — R79.89 ABNORMAL THYROID BLOOD TEST: ICD-10-CM

## 2024-07-31 DIAGNOSIS — E03.9 ACQUIRED HYPOTHYROIDISM: ICD-10-CM

## 2024-07-31 DIAGNOSIS — Z00.00 ENCOUNTER FOR ANNUAL GENERAL MEDICAL EXAMINATION WITHOUT ABNORMAL FINDINGS IN ADULT: Primary | ICD-10-CM

## 2024-07-31 DIAGNOSIS — E78.49 FAMILIAL HYPERLIPIDEMIA: ICD-10-CM

## 2024-07-31 DIAGNOSIS — Z13.6 SCREENING FOR HYPERTENSION: ICD-10-CM

## 2024-07-31 PROCEDURE — 3725F SCREEN DEPRESSION PERFORMED: CPT | Performed by: NURSE PRACTITIONER

## 2024-07-31 PROCEDURE — 99396 PREV VISIT EST AGE 40-64: CPT | Performed by: NURSE PRACTITIONER

## 2024-07-31 PROCEDURE — 93000 ELECTROCARDIOGRAM COMPLETE: CPT | Performed by: NURSE PRACTITIONER

## 2024-07-31 RX ORDER — SIROLIMUS 2 MG/1
TABLET, FILM COATED ORAL
COMMUNITY
Start: 2024-06-22

## 2024-07-31 RX ORDER — DOXYCYCLINE HYCLATE 100 MG/1
100 CAPSULE ORAL 2 TIMES DAILY
COMMUNITY
Start: 2024-05-02

## 2024-07-31 NOTE — PROGRESS NOTES
[facility] HEALTH MAINTENANCE OFFICE VISIT  [facility] PHYSICIANS    NAME: Stevie Locke  AGE: 49 y.o. SEX: male  : 1975     DATE: 2024    Assessment and Plan     1. Acquired hypothyroidism  -     TSH, 3rd generation; Future  -     T4, free; Future  -     TSH, 3rd generation  -     T4, free  2. Pre-diabetes  3. Abnormal thyroid exam  4. Abnormal thyroid blood test  -     TSH, 3rd generation; Future  -     T4, free; Future  -     TSH, 3rd generation  -     T4, free      Patient Counseling:   { Adult CPE Counseling List:86172}    Immunizations reviewed: { immunization CPE list:33292}  Discussed benefits of:  {LK Adult CPE Screening counselin}.  BMI Counseling: Body mass index is 32.33 kg/m². Discussed with patient's BMI with him. The BMI {VB BMI Counselin}    No follow-ups on file.        Chief Complaint     Chief Complaint   Patient presents with    Physical Exam     Dimitris/DELMI       History of Present Illness     HPI    Well Adult Physical   Patient here for a comprehensive physical exam.      Diet and Physical Activity  Diet: {diet; well adult:88189}  Exercise: {exericse; well adult:07260}      Depression Screen  PHQ-2/9 Depression Screening    Little interest or pleasure in doing things: 0 - not at all  Feeling down, depressed, or hopeless: 0 - not at all  PHQ-2 Score: 0  PHQ-2 Interpretation: Negative depression screen          General Health  Hearing: {WELL ADULT HEARIN}  Vision: {vision; well adult:62407}  Dental: {dental; well adult:82719}    Reproductive Health  {LK Adult CPE Screening counselin}      The following portions of the patient's history were reviewed and updated as appropriate: allergies, current medications, past family history, past medical history, past social history, past surgical history and problem list.    Review of Systems     Review of Systems    Past Medical History     History reviewed. No  "pertinent past medical history.    Past Surgical History     Past Surgical History:   Procedure Laterality Date    HERNIA REPAIR      Done at birth     MOUTH SURGERY  02/2019    2 teeth    SHOULDER SURGERY Right 12/2010       Social History     Social History     Socioeconomic History    Marital status: Single     Spouse name: None    Number of children: None    Years of education: None    Highest education level: None   Occupational History    None   Tobacco Use    Smoking status: Never     Passive exposure: Never    Smokeless tobacco: Never   Vaping Use    Vaping status: Never Used   Substance and Sexual Activity    Alcohol use: Yes     Comment: occasionally - once per week    Drug use: No    Sexual activity: None   Other Topics Concern    None   Social History Narrative    Caffeine use    Drinks coffee    Never a smoker - As per [employer]      Social Determinants of Health     Financial Resource Strain: Not on file   Food Insecurity: Not on file   Transportation Needs: Not on file   Physical Activity: Not on file   Stress: Not on file   Social Connections: Not on file   Intimate Partner Violence: Not on file   Housing Stability: Not on file       Family History     Family History   Problem Relation Age of Onset    NASIR disease Mother     Thyroid disease Mother     Thyroid disease Father     Esophageal cancer Maternal Aunt     Depression Cousin     Substance Abuse Neg Hx     Mental illness Neg Hx        Current Medications       Current Outpatient Medications:     ALPRAZolam (XANAX) 0.25 mg tablet, TAKE 1 TABLET(0.25 MG) BY MOUTH TWICE DAILY AS NEEDED FOR ANXIETY, Disp: 60 tablet, Rfl: 0    anastrozole (ARIMIDEX) 1 mg tablet, Take 0.5 mg by mouth once a week  , Disp: , Rfl:     ascorbic acid (VITAMIN C) 500 mg tablet, Take 500 mg by mouth daily, Disp: , Rfl:     B-D DISP NEEDLE 25GX1\" 25G X 1\" MISC, Use as directed, Disp: , Rfl: 1    BD Insulin Syringe U/F 31G X 5/16\" 1 ML MISC, USE 1 SYRINGE DAILY AS DIRECTED, " "Disp: , Rfl:     Calcium Carbonate-Vitamin D (CALCIUM 600+D PO), Take by mouth daily, Disp: , Rfl:     Cholecalciferol (VITAMIN D3) 5000 units CAPS, Take by mouth daily, Disp: , Rfl:     Clindamycin Phos-Benzoyl Perox gel, APPLY TOPICALLY TO THE AFFECTED AREAS OF THE SKIN ONCE DAILY AS DIRECTED, Disp: 45 g, Rfl: 1    dicyclomine (BENTYL) 10 mg capsule, Take 10 mg by mouth in the morning  , Disp: , Rfl:     doxepin (SINEquan) 10 mg capsule, Take 10 mg by mouth daily at bedtime, Disp: , Rfl:     esomeprazole (NexIUM) 40 MG capsule, TAKE ONE CAPSULE BY MOUTH ONE TIME DAILY, Disp: 180 capsule, Rfl: 0    finasteride (PROPECIA) 1 MG tablet, Take 1 tablet by mouth daily, Disp: , Rfl:     minoxidil (ROGAINE) 2 % external solution, APPLY 20-30 drops topically TO SCALP TWICE DAILY, Disp: , Rfl:     Multiple Vitamin (MULTIVITAMIN) tablet, Take 1 tablet by mouth daily, Disp: , Rfl:     NON FORMULARY, in the morning Immune supplement, Disp: , Rfl:     QUERCETIN PO, Take by mouth daily, Disp: , Rfl:     sirolimus (RAPAMUNE) 2 MG tablet, , Disp: , Rfl:     Testosterone Enanthate (Xyosted) 50 MG/0.5ML SOAJ, Inject under the skin Once a week, Disp: , Rfl:     thyroid desiccated (ARMOUR) 30 mg tablet, Take 1 tablet by mouth daily , Disp: , Rfl:     Zinc 50 MG TABS, Take 2 tablets by mouth daily, Disp: , Rfl:     desonide (DESOWEN) 0.05 % cream, APPLY TO AFFECTED AREA TWICE A DAY (Patient not taking: Reported on 3/28/2024), Disp: 30 g, Rfl: 0    doxycycline hyclate (VIBRAMYCIN) 100 mg capsule, Take 100 mg by mouth 2 (two) times a day (Patient not taking: Reported on 7/31/2024), Disp: , Rfl:     NON FORMULARY, APPLY topically TO SCALP TWICE DAILY, Disp: , Rfl:      Allergies     Allergies   Allergen Reactions    Itraconazole Other (See Comments)     unknown    Penicillins Rash    Sulfa Antibiotics Rash       Objective     /70   Pulse 92   Temp 98.1 °F (36.7 °C) (Temporal)   Resp 16   Ht 5' 8\" (1.727 m)   Wt 96.4 kg (212 lb " 9.6 oz)   SpO2 98%   BMI 32.33 kg/m²      Physical Exam          BILL Fernandes  [facility]

## 2024-07-31 NOTE — PROGRESS NOTES
FAMILY PRACTICE HEALTH MAINTENANCE OFFICE VISIT  [facility] - [facility] PHYSICIANS    NAME: Stevie Locke  AGE: 49 y.o. SEX: male  : 1975     DATE: 2024    Assessment and Plan     1. Encounter for annual general medical examination without abnormal findings in adult  Comments:  Age appropriate screenings and recommendations discussed. Labs reviewed.  2. Acquired hypothyroidism  -     TSH, 3rd generation; Future  -     T4, free; Future  -     TSH, 3rd generation  -     T4, free  3. Pre-diabetes  Assessment & Plan:  A1c 5.6 - reviewed with patient in the office.  Encourage nutrition and exercise as tolerated.   Orders:  -     POCT ECG  4. Abnormal thyroid blood test  Comments:  Advise pt to take medication seperate from other medications to avoid foods that interact with medication. Recheck thyroid studies in 4 weeks.  Orders:  -     TSH, 3rd generation; Future  -     T4, free; Future  -     TSH, 3rd generation  -     T4, free  5. Screening for hypertension  -     POCT ECG  6. Familial hyperlipidemia  Assessment & Plan:  Elevated LDL  Recommend heart healthy/mediteranean style eating. Discussed nutrition and exercise in detail.   7. Testosterone deficiency  Assessment & Plan:  Management through specialty. Stable no issues.       Patient Counseling:   Nutrition: Stressed importance of a well balanced diet, moderation of sodium/saturated fat, caloric balance and sufficient intake of fiber  Exercise: Stressed the importance of regular exercise with a goal of 150 minutes per week  Dental Health: Discussed daily flossing and brushing and regular dental visits     Immunizations reviewed: Up To Date  Discussed benefits of:  Colon Cancer Screening, Prostate Cancer Screening , and Screening labs.  BMI Counseling: Body mass index is 32.33 kg/m². Discussed with patient's BMI with him. The BMI is above normal. Nutrition recommendations include 3-5 servings of fruits/vegetables daily,  reducing fast food intake, and consuming healthier snacks. Exercise recommendations include exercising 3-5 times per week.    Return in about 6 months (around 1/31/2025) for Recheck LIPIDS.        Chief Complaint     Chief Complaint   Patient presents with    Physical Exam     Ajay       History of Present Illness     HPI    Well Adult Physical   Patient here for a comprehensive physical exam.      Diet and Physical Activity  Diet: well balanced diet  Exercise: intermittently      Depression Screen  PHQ-2/9 Depression Screening    Little interest or pleasure in doing things: 0 - not at all  Feeling down, depressed, or hopeless: 0 - not at all  PHQ-2 Score: 0  PHQ-2 Interpretation: Negative depression screen          General Health  Hearing: Normal:  bilateral  Vision: goes for regular eye exams  Dental: regular dental visits    Reproductive Health  No issues       The following portions of the patient's history were reviewed and updated as appropriate: allergies, current medications, past family history, past medical history, past social history, past surgical history and problem list.    Review of Systems     Review of Systems   Constitutional:  Negative for diaphoresis, fatigue and fever.   HENT:  Negative for ear pain and hearing loss.    Eyes:  Negative for pain and visual disturbance.   Respiratory:  Negative for chest tightness and shortness of breath.    Cardiovascular:  Negative for chest pain, palpitations and leg swelling.   Gastrointestinal:  Negative for abdominal pain, constipation and diarrhea.   Genitourinary:  Negative for difficulty urinating.   Musculoskeletal:  Negative for arthralgias and myalgias.   Skin:  Negative for rash.   Neurological:  Negative for dizziness, numbness and headaches.   Psychiatric/Behavioral:  Negative for sleep disturbance.        Past Medical History     History reviewed. No pertinent past medical history.    Past Surgical History     Past Surgical History:  "  Procedure Laterality Date    HERNIA REPAIR      Done at birth     MOUTH SURGERY  02/2019    2 teeth    SHOULDER SURGERY Right 12/2010       Social History     Social History     Socioeconomic History    Marital status: Single     Spouse name: None    Number of children: None    Years of education: None    Highest education level: None   Occupational History    None   Tobacco Use    Smoking status: Never     Passive exposure: Never    Smokeless tobacco: Never   Vaping Use    Vaping status: Never Used   Substance and Sexual Activity    Alcohol use: Yes     Comment: occasionally - once per week    Drug use: No    Sexual activity: None   Other Topics Concern    None   Social History Narrative    Caffeine use    Drinks coffee    [facility] - [facility]      Social Determinants of Health     Financial Resource Strain: Not on file   Food Insecurity: Not on file   Transportation Needs: Not on file   Physical Activity: Not on file   Stress: Not on file   Social Connections: Not on file   Intimate Partner Violence: Not on file   Housing Stability: Not on file       Family History     Family History   Problem Relation Age of Onset    NASIR disease Mother     Thyroid disease Mother     Thyroid disease Father     Esophageal cancer Maternal Aunt     Depression Cousin     Substance Abuse Neg Hx     Mental illness Neg Hx        Current Medications       Current Outpatient Medications:     ALPRAZolam (XANAX) 0.25 mg tablet, TAKE 1 TABLET(0.25 MG) BY MOUTH TWICE DAILY AS NEEDED FOR ANXIETY, Disp: 60 tablet, Rfl: 0    anastrozole (ARIMIDEX) 1 mg tablet, Take 0.5 mg by mouth once a week  , Disp: , Rfl:     ascorbic acid (VITAMIN C) 500 mg tablet, Take 500 mg by mouth daily, Disp: , Rfl:     B-D DISP NEEDLE 25GX1\" 25G X 1\" MISC, Use as directed, Disp: , Rfl: 1    BD Insulin Syringe U/F 31G X 5/16\" 1 ML MISC, USE 1 SYRINGE DAILY AS DIRECTED, Disp: , Rfl:     Calcium Carbonate-Vitamin D (CALCIUM 600+D PO), Take by mouth daily, " "Disp: , Rfl:     Cholecalciferol (VITAMIN D3) 5000 units CAPS, Take by mouth daily, Disp: , Rfl:     Clindamycin Phos-Benzoyl Perox gel, APPLY TOPICALLY TO THE AFFECTED AREAS OF THE SKIN ONCE DAILY AS DIRECTED, Disp: 45 g, Rfl: 1    dicyclomine (BENTYL) 10 mg capsule, Take 10 mg by mouth in the morning  , Disp: , Rfl:     doxepin (SINEquan) 10 mg capsule, Take 10 mg by mouth daily at bedtime, Disp: , Rfl:     esomeprazole (NexIUM) 40 MG capsule, TAKE ONE CAPSULE BY MOUTH ONE TIME DAILY, Disp: 180 capsule, Rfl: 0    finasteride (PROPECIA) 1 MG tablet, Take 1 tablet by mouth daily, Disp: , Rfl:     minoxidil (ROGAINE) 2 % external solution, APPLY 20-30 drops topically TO SCALP TWICE DAILY, Disp: , Rfl:     Multiple Vitamin (MULTIVITAMIN) tablet, Take 1 tablet by mouth daily, Disp: , Rfl:     NON FORMULARY, in the morning Immune supplement, Disp: , Rfl:     QUERCETIN PO, Take by mouth daily, Disp: , Rfl:     sirolimus (RAPAMUNE) 2 MG tablet, , Disp: , Rfl:     Testosterone Enanthate (Xyosted) 50 MG/0.5ML SOAJ, Inject under the skin Once a week, Disp: , Rfl:     thyroid desiccated (ARMOUR) 30 mg tablet, Take 1 tablet by mouth daily , Disp: , Rfl:     Zinc 50 MG TABS, Take 2 tablets by mouth daily, Disp: , Rfl:     desonide (DESOWEN) 0.05 % cream, APPLY TO AFFECTED AREA TWICE A DAY (Patient not taking: Reported on 3/28/2024), Disp: 30 g, Rfl: 0    doxycycline hyclate (VIBRAMYCIN) 100 mg capsule, Take 100 mg by mouth 2 (two) times a day (Patient not taking: Reported on 7/31/2024), Disp: , Rfl:     NON FORMULARY, APPLY topically TO SCALP TWICE DAILY, Disp: , Rfl:      Allergies     Allergies   Allergen Reactions    Itraconazole Other (See Comments)     unknown    Penicillins Rash    Sulfa Antibiotics Rash       Objective     /70   Pulse 92   Temp 98.1 °F (36.7 °C) (Temporal)   Resp 16   Ht 5' 8\" (1.727 m)   Wt 96.4 kg (212 lb 9.6 oz)   SpO2 98%   BMI 32.33 kg/m²      Physical Exam  Vitals reviewed. "   Constitutional:       General: He is not in acute distress.     Appearance: He is well-developed. He is not diaphoretic.   HENT:      Head: Normocephalic and atraumatic.      Right Ear: Tympanic membrane, ear canal and external ear normal.      Left Ear: Tympanic membrane, ear canal and external ear normal.   Eyes:      General: Lids are normal.      Extraocular Movements: Extraocular movements intact and EOM normal.      Conjunctiva/sclera: Conjunctivae normal.      Pupils: Pupils are equal, round, and reactive to light. Pupils are equal.      Funduscopic exam:     Right eye: Red reflex present.         Left eye: Red reflex present.  Neck:      Thyroid: No thyroid mass or thyromegaly.      Vascular: No carotid bruit.      Trachea: No tracheal deviation.   Cardiovascular:      Rate and Rhythm: Normal rate and regular rhythm.      Pulses: Normal pulses.      Heart sounds: Normal heart sounds, S1 normal and S2 normal. No murmur heard.  Pulmonary:      Effort: Pulmonary effort is normal.      Breath sounds: Normal breath sounds. No decreased breath sounds, wheezing, rhonchi or rales.   Abdominal:      General: Bowel sounds are normal. There is no distension.      Palpations: Abdomen is soft. There is no hepatomegaly or splenomegaly.      Tenderness: There is no abdominal tenderness.   Musculoskeletal:         General: No tenderness, deformity or edema. Normal range of motion.      Cervical back: Full passive range of motion without pain, normal range of motion and neck supple.      Right lower leg: No edema.      Left lower leg: No edema.   Lymphadenopathy:      Cervical: No cervical adenopathy.      Upper Body:      Right upper body: No supraclavicular adenopathy.      Left upper body: No supraclavicular adenopathy.   Skin:     General: Skin is warm and dry.      Findings: No rash.   Neurological:      General: No focal deficit present.      Mental Status: He is alert and oriented to person, place, and time.       Cranial Nerves: No cranial nerve deficit.      Coordination: Coordination normal.      Deep Tendon Reflexes: Reflexes are normal and symmetric.   Psychiatric:         Mood and Affect: Mood and affect normal.         Speech: Speech normal.         Behavior: Behavior normal.         Thought Content: Thought content normal.         Judgment: Judgment normal.             BILL Fernandes  [facility]

## 2024-07-31 NOTE — ASSESSMENT & PLAN NOTE
Elevated LDL  Recommend heart healthy/mediteranean style eating. Discussed nutrition and exercise in detail.

## 2024-08-12 DIAGNOSIS — F41.9 ANXIETY: ICD-10-CM

## 2024-08-13 RX ORDER — ALPRAZOLAM 0.25 MG
TABLET ORAL
Qty: 60 TABLET | Refills: 0 | Status: SHIPPED | OUTPATIENT
Start: 2024-08-13

## 2024-08-13 NOTE — TELEPHONE ENCOUNTER
Mostly recovered at this time  Reviewed s/s to go to ER with  Requested medication(s) are due for refill today: Yes  Patient has already received a courtesy refill: No  Other reason request has been forwarded to provider: This refill cannot be delegated

## 2024-09-25 DIAGNOSIS — K21.9 GASTROESOPHAGEAL REFLUX DISEASE WITHOUT ESOPHAGITIS: ICD-10-CM

## 2024-09-25 RX ORDER — ESOMEPRAZOLE MAGNESIUM 40 MG/1
CAPSULE, DELAYED RELEASE ORAL
Qty: 180 CAPSULE | Refills: 0 | Status: SHIPPED | OUTPATIENT
Start: 2024-09-25

## 2024-10-20 DIAGNOSIS — F41.9 ANXIETY: ICD-10-CM

## 2024-10-22 RX ORDER — ALPRAZOLAM 0.25 MG/1
TABLET ORAL
Qty: 60 TABLET | Refills: 0 | Status: SHIPPED | OUTPATIENT
Start: 2024-10-22

## 2024-11-04 DIAGNOSIS — L30.9 ECZEMA, UNSPECIFIED TYPE: ICD-10-CM

## 2024-11-06 RX ORDER — CLINDAMYCIN PHOSPHATE AND BENZOYL PEROXIDE 10; 50 MG/G; MG/G
GEL TOPICAL
Qty: 45 G | Refills: 1 | Status: SHIPPED | OUTPATIENT
Start: 2024-11-06

## 2024-12-16 ENCOUNTER — TELEMEDICINE (OUTPATIENT)
Dept: FAMILY MEDICINE CLINIC | Facility: CLINIC | Age: 49
End: 2024-12-16
Payer: COMMERCIAL

## 2024-12-16 DIAGNOSIS — F41.9 ANXIETY: ICD-10-CM

## 2024-12-16 PROCEDURE — 99213 OFFICE O/P EST LOW 20 MIN: CPT | Performed by: STUDENT IN AN ORGANIZED HEALTH CARE EDUCATION/TRAINING PROGRAM

## 2024-12-16 RX ORDER — ALPRAZOLAM 0.25 MG/1
0.25 TABLET ORAL 2 TIMES DAILY PRN
Qty: 60 TABLET | Refills: 0 | Status: SHIPPED | OUTPATIENT
Start: 2024-12-16

## 2024-12-16 NOTE — ASSESSMENT & PLAN NOTE
Orders:  •  ALPRAZolam (XANAX) 0.25 mg tablet; Take 1 tablet (0.25 mg total) by mouth 2 (two) times a day as needed for anxiety    -Discussed side effects of xanax with patient, he would like to wean off medication. He will come in person to discuss alternate medications for sleep.

## 2024-12-16 NOTE — PROGRESS NOTES
Virtual Brief Visit  Name: Stevie Locke      : 1975      MRN: [id number]  Encounter Provider: Philomena Michel MD  Encounter Date: 2024   Encounter department: [facility]    This Visit is being completed by telephone. The Patient is located at Home and in the following state in which I hold an active license [place]    The patient was identified by name and date of birth. Stevie Locke was informed that this is a telemedicine visit and that the visit is being conducted through Telephone.  My office door was closed. No one else was in the room.  He acknowledged consent and understanding of privacy and security of the video platform. The patient has agreed to participate and understands they can discontinue the visit at any time.    Patient is aware this is a billable service.     :  Assessment & Plan  Anxiety    Orders:  •  ALPRAZolam (XANAX) 0.25 mg tablet; Take 1 tablet (0.25 mg total) by mouth 2 (two) times a day as needed for anxiety    -Discussed side effects of xanax with patient, he would like to wean off medication. He will come in person to discuss alternate medications for sleep.      History of Present Illness   HPI    Patient presents for med check. No acute concerns. He would like to eventually stop this medication but will discuss in person.       Visit Time  Total Visit Duration:

## 2025-01-29 DIAGNOSIS — F41.9 ANXIETY: ICD-10-CM

## 2025-01-30 RX ORDER — ALPRAZOLAM 0.25 MG/1
TABLET ORAL
Qty: 60 TABLET | Refills: 0 | Status: SHIPPED | OUTPATIENT
Start: 2025-01-30

## 2025-03-13 DIAGNOSIS — F41.9 ANXIETY: ICD-10-CM

## 2025-03-14 DIAGNOSIS — L30.9 ECZEMA, UNSPECIFIED TYPE: ICD-10-CM

## 2025-03-14 RX ORDER — ALPRAZOLAM 0.25 MG
TABLET ORAL
Qty: 60 TABLET | Refills: 0 | Status: SHIPPED | OUTPATIENT
Start: 2025-03-14

## 2025-03-14 RX ORDER — CLINDAMYCIN PHOSPHATE AND BENZOYL PEROXIDE 10; 50 MG/G; MG/G
GEL TOPICAL
Qty: 45 G | Refills: 1 | Status: SHIPPED | OUTPATIENT
Start: 2025-03-14

## 2025-03-27 DIAGNOSIS — K21.9 GASTROESOPHAGEAL REFLUX DISEASE WITHOUT ESOPHAGITIS: ICD-10-CM

## 2025-03-28 RX ORDER — ESOMEPRAZOLE MAGNESIUM 40 MG/1
40 CAPSULE, DELAYED RELEASE ORAL DAILY
Qty: 180 CAPSULE | Refills: 1 | Status: SHIPPED | OUTPATIENT
Start: 2025-03-28

## 2025-05-08 DIAGNOSIS — F41.9 ANXIETY: ICD-10-CM

## 2025-05-09 NOTE — TELEPHONE ENCOUNTER
Requested medication(s) are due for refill today: Yes  Patient has already received a courtesy refill: No  Other reason request has been forwarded to provider: This refill cannot be delegated   
WDL

## 2025-05-11 RX ORDER — ALPRAZOLAM 0.25 MG
TABLET ORAL
Qty: 60 TABLET | Refills: 0 | Status: SHIPPED | OUTPATIENT
Start: 2025-05-11

## 2025-07-09 DIAGNOSIS — F41.9 ANXIETY: ICD-10-CM

## 2025-07-09 DIAGNOSIS — L30.9 ECZEMA, UNSPECIFIED TYPE: ICD-10-CM

## 2025-07-09 RX ORDER — CLINDAMYCIN PHOSPHATE AND BENZOYL PEROXIDE 10; 50 MG/G; MG/G
GEL TOPICAL
Qty: 45 G | Refills: 1 | Status: SHIPPED | OUTPATIENT
Start: 2025-07-09

## 2025-07-09 NOTE — TELEPHONE ENCOUNTER
Left message on machine for Christiano to schedule an in office appt for a med ck or he can schedule his physical on or after 8/1/25.

## 2025-07-14 RX ORDER — ALPRAZOLAM 0.25 MG
TABLET ORAL
Qty: 60 TABLET | OUTPATIENT
Start: 2025-07-14

## 2025-07-14 NOTE — TELEPHONE ENCOUNTER
Dr Michel, please refuse rx.  Patient has been notified to scheduled a med ck for refills.  Thanks

## 2025-07-17 DIAGNOSIS — F41.9 ANXIETY: ICD-10-CM

## 2025-07-17 RX ORDER — ALPRAZOLAM 0.25 MG
TABLET ORAL
Qty: 60 TABLET | Refills: 0 | Status: CANCELLED | OUTPATIENT
Start: 2025-07-17

## 2025-07-17 NOTE — TELEPHONE ENCOUNTER
Reason for call:   [x] Refill   [] Prior Auth  [] Other:     Office:   [x] PCP/Provider -   [] Specialty/Provider -     Medication:  ALPRAZolam (XANAX) 0.25 mg tablet-  TAKE 1 TABLET(0.25 MG) BY MOUTH TWICE DAILY AS NEEDED FOR ANXIETY       Pharmacy: [place], [place]     Local Pharmacy   Does the patient have enough for 3 days?   [] Yes   [x] No - Send as HP to POD    Mail Away Pharmacy   Does the patient have enough for 10 days?   [] Yes   [] No - Send as HP to POD

## 2025-07-21 NOTE — TELEPHONE ENCOUNTER
Patient is scheduled for a physical on 8/1. He is asking if his medication can be filled prior to his appt time.

## 2025-08-01 ENCOUNTER — OFFICE VISIT (OUTPATIENT)
Dept: FAMILY MEDICINE CLINIC | Facility: CLINIC | Age: 50
End: 2025-08-01
Payer: COMMERCIAL

## 2025-08-01 VITALS
TEMPERATURE: 98.1 F | SYSTOLIC BLOOD PRESSURE: 120 MMHG | HEIGHT: 67 IN | RESPIRATION RATE: 16 BRPM | DIASTOLIC BLOOD PRESSURE: 80 MMHG | BODY MASS INDEX: 31.45 KG/M2 | WEIGHT: 200.4 LBS | OXYGEN SATURATION: 98 % | HEART RATE: 95 BPM

## 2025-08-01 DIAGNOSIS — E78.49 FAMILIAL HYPERLIPIDEMIA: ICD-10-CM

## 2025-08-01 DIAGNOSIS — Z00.00 ANNUAL PHYSICAL EXAM: Primary | ICD-10-CM

## 2025-08-01 DIAGNOSIS — M77.12 LATERAL EPICONDYLITIS OF LEFT ELBOW: ICD-10-CM

## 2025-08-01 DIAGNOSIS — R73.03 PRE-DIABETES: ICD-10-CM

## 2025-08-01 DIAGNOSIS — E03.9 ACQUIRED HYPOTHYROIDISM: ICD-10-CM

## 2025-08-01 DIAGNOSIS — F41.9 ANXIETY: ICD-10-CM

## 2025-08-01 PROBLEM — K58.8 OTHER IRRITABLE BOWEL SYNDROME: Status: ACTIVE | Noted: 2025-08-01

## 2025-08-01 PROCEDURE — 99396 PREV VISIT EST AGE 40-64: CPT | Performed by: STUDENT IN AN ORGANIZED HEALTH CARE EDUCATION/TRAINING PROGRAM

## 2025-08-01 PROCEDURE — 99214 OFFICE O/P EST MOD 30 MIN: CPT | Performed by: STUDENT IN AN ORGANIZED HEALTH CARE EDUCATION/TRAINING PROGRAM

## 2025-08-01 RX ORDER — PREDNISONE 10 MG/1
TABLET ORAL
Qty: 27 TABLET | Refills: 0 | Status: SHIPPED | OUTPATIENT
Start: 2025-08-01 | End: 2025-08-11

## 2025-08-01 RX ORDER — ALPRAZOLAM 0.5 MG
0.5 TABLET ORAL
Qty: 30 TABLET | Refills: 0 | Status: SHIPPED | OUTPATIENT
Start: 2025-08-01